# Patient Record
Sex: FEMALE | Race: WHITE | NOT HISPANIC OR LATINO | Employment: OTHER | URBAN - METROPOLITAN AREA
[De-identification: names, ages, dates, MRNs, and addresses within clinical notes are randomized per-mention and may not be internally consistent; named-entity substitution may affect disease eponyms.]

---

## 2016-09-07 LAB — MAMMOGRAM: NORMAL

## 2016-10-20 LAB
HBA1C MFR BLD: 6.6 % (ref 0–5.7)
TSH SERPL-ACNC: 1.65 UIU/ML (ref 0.35–4.74)

## 2017-01-26 ENCOUNTER — TRANSFERRED RECORDS (OUTPATIENT)
Dept: HEALTH INFORMATION MANAGEMENT | Facility: CLINIC | Age: 72
End: 2017-01-26

## 2017-01-26 LAB
HBA1C MFR BLD: 6.7 % (ref 0–5.7)
POTASSIUM SERPL-SCNC: 4.1 MMOL/L (ref 3.5–5)
TSH SERPL-ACNC: 1.34 UIU/ML (ref 0.35–4.94)

## 2017-03-01 ENCOUNTER — RECORDS - HEALTHEAST (OUTPATIENT)
Dept: BONE DENSITY | Facility: CLINIC | Age: 72
End: 2017-03-01

## 2017-03-01 ENCOUNTER — OFFICE VISIT - HEALTHEAST (OUTPATIENT)
Dept: INTERNAL MEDICINE | Facility: CLINIC | Age: 72
End: 2017-03-01

## 2017-03-01 DIAGNOSIS — M81.0 OP (OSTEOPOROSIS): ICD-10-CM

## 2017-03-01 DIAGNOSIS — M48.50XA VERTEBRAL COMPRESSION FRACTURE (H): ICD-10-CM

## 2017-03-01 DIAGNOSIS — M81.0 AGE-RELATED OSTEOPOROSIS WITHOUT CURRENT PATHOLOGICAL FRACTURE: ICD-10-CM

## 2017-03-02 ENCOUNTER — COMMUNICATION - HEALTHEAST (OUTPATIENT)
Dept: ENDOCRINOLOGY | Facility: CLINIC | Age: 72
End: 2017-03-02

## 2017-04-13 ENCOUNTER — OFFICE VISIT (OUTPATIENT)
Dept: FAMILY MEDICINE | Facility: CLINIC | Age: 72
End: 2017-04-13
Payer: COMMERCIAL

## 2017-04-13 VITALS
HEIGHT: 61 IN | SYSTOLIC BLOOD PRESSURE: 128 MMHG | DIASTOLIC BLOOD PRESSURE: 73 MMHG | TEMPERATURE: 97.7 F | WEIGHT: 135 LBS | HEART RATE: 78 BPM | BODY MASS INDEX: 25.49 KG/M2

## 2017-04-13 DIAGNOSIS — R09.81 NASAL CONGESTION: ICD-10-CM

## 2017-04-13 DIAGNOSIS — H69.93 ETD (EUSTACHIAN TUBE DYSFUNCTION), BILATERAL: ICD-10-CM

## 2017-04-13 DIAGNOSIS — E11.8 TYPE 2 DIABETES MELLITUS WITH COMPLICATION, WITHOUT LONG-TERM CURRENT USE OF INSULIN (H): ICD-10-CM

## 2017-04-13 DIAGNOSIS — K59.00 CONSTIPATION, UNSPECIFIED CONSTIPATION TYPE: Primary | ICD-10-CM

## 2017-04-13 DIAGNOSIS — S32.010S COMPRESSION FRACTURE OF L1 LUMBAR VERTEBRA, SEQUELA: ICD-10-CM

## 2017-04-13 PROCEDURE — 99214 OFFICE O/P EST MOD 30 MIN: CPT | Performed by: NURSE PRACTITIONER

## 2017-04-13 RX ORDER — ASPIRIN 81 MG
100 TABLET, DELAYED RELEASE (ENTERIC COATED) ORAL DAILY
Qty: 90 TABLET | Refills: 1 | Status: SHIPPED | OUTPATIENT
Start: 2017-04-13 | End: 2021-11-30

## 2017-04-13 RX ORDER — POLYETHYLENE GLYCOL 3350 17 G/17G
1 POWDER, FOR SOLUTION ORAL DAILY
Qty: 510 G | Refills: 3 | Status: SHIPPED | OUTPATIENT
Start: 2017-04-13 | End: 2021-11-30

## 2017-04-13 NOTE — NURSING NOTE
"Initial /73  Pulse 78  Temp 97.7  F (36.5  C) (Tympanic)  Ht 5' 1\" (1.549 m)  Wt 135 lb (61.2 kg)  Breastfeeding? No  BMI 25.51 kg/m2 Estimated body mass index is 25.51 kg/(m^2) as calculated from the following:    Height as of this encounter: 5' 1\" (1.549 m).    Weight as of this encounter: 135 lb (61.2 kg). .    Kelsey Pierre CMA ([facility])  "

## 2017-04-13 NOTE — MR AVS SNAPSHOT
After Visit Summary   4/13/2017    Marcia Lawrence    MRN: [id number]           Patient Information     Date Of Birth          1945        Visit Information        Provider Department      4/13/2017 10:40 AM Anna Champion, NP [facility]        Today's Diagnoses     Constipation, unspecified constipation type    -  1    Nasal congestion        Type 2 diabetes mellitus with complication, without long-term current use of insulin (H)        Compression fracture of L1 lumbar vertebra, sequela        ETD (eustachian tube dysfunction), bilateral          Care Instructions               Bowel regimen:  Docusate (Colace) - 1 gelcap once daily in am EVERYDAY.  Miralax 1/2 capful EVERYDAY - except for the next 2-3 days take 1 capful until you have a bowel movement then go back to 1/2 capful after that.    Increase fluids (8 - 8 ounce glasses of non caffeinated beverages)  Increase fiber in your diet.    Follow up with me if your symptoms do not improve.    ELSY Mcknight                       Fiber in Your Diet  Why do I need fiber in my diet?   Dietary fiber is the part of plants that cannot be digested. There are 2 kinds of dietary fiber. Insoluble fiber adds bulk to keep foods moving through the digestive system. Soluble fiber holds water which, in turn, softens the stool for easy bowel movements. Fiber is an important part of your diet even though it passes through your body. A high-fiber diet can:  Reduce cholesterol levels.   Help you have regular bowel movements.   Improve your blood sugar level if you have diabetes.   Treat diverticular disease (inflammation of part of the intestine) and irritable bowel syndrome (abdominal pain, diarrhea, and constipation that come and go).   Help you lose weight. High-fiber foods are usually lower in calories and provide a feeling of fullness.  If you do not have enough fiber in your diet, you may have constipation. Your bowel movements  may be small, hard, and dry.  What foods contain fiber?   Breads, cereals, and pasta made with whole-grain flour, brown and wild rice, oats, bulgur, popcorn, and quinoa are high-fiber foods. Breakfast cereals and most grain products list the bran or fiber content so you can know which products are high in fiber.  All fruits and vegetables also contain fiber. Dried beans, peas, nuts, leafy vegetables, raisins, prunes, apples, berries and citrus fruits are all especially good sources of fiber.  How much fiber do I need in my diet?   You should have at least 14 grams of fiber for every 1000 calories that you eat every day. Generally, women should have 25 grams (g) of fiber and men should have 38 grams. Read the label on food packages to find out how much fiber a serving of a food will provide. Foods containing more than 20% of the daily value of fiber per serving are considered high in fiber.  What can I do to increase fiber?   When increasing the fiber in your diet, it is best to do so slowly, because large, sudden increases can cause discomfort, gas, and bloating. Start with small changes, like switching to whole-grain bread, and add a new source of fiber each week or two. You may have some gas or bloating at first, but your body will usually adjust in time. If you keep having uncomfortable gas, you can try a natural enzyme supplement that helps to digest the gas-forming part of plant foods. The enzyme is sold in liquid and pill form and you don t need a prescription for it.  Start your day with a high-fiber breakfast cereal.   Buy more fruits and vegetables. If you buy them, you are more likely to eat them. Use carrot sticks or apple slices for snacks. Include fruits or vegetables with every meal. Cooked fiber is just as effective as raw fiber.   Eat whole-grain breads.   Add whole grains, dried beans, and vegetables to casseroles.   Serve fruit-based desserts.   If you have constipation even though you have added  high-fiber foods to your diet, make sure you are drinking enough fluids and talk to your healthcare provider about fiber laxatives. Psyllium is a soluble fiber that is often used for this purpose. It can be taken as a pill or as a powder that is mixed in a glass of water. Always read and follow the directions on the label carefully.  Adding fiber to your diet is easy, and a high-fiber diet can provide long-term health benefits.    Published by [employer].  This content is reviewed periodically and is subject to change as new health information becomes available. The information is intended to inform and educate and is not a replacement for medical evaluation, advice, diagnosis or treatment by a healthcare professional.  Developed by Gloria Cross MD, for [employer].    2011 [facility] and/or its affiliates. All rights reserved.            Constipation  What is constipation?   Constipation means that bowel movements are infrequent and hard to pass and cause you to strain during bowel movements. It is not considered to be constipation if the bowel movement is not hard and difficult to pass.  What is the normal frequency of bowel movements for one person can be different for another person. For some people, 3 times a day is normal. For others once every 3 days may be normal. What's important is whether there is a change in what has been normal for you.   How does it occur?   You may have constipation because:  You ignore the urge and wait too long to have bowel movements.   You overuse some types of laxatives.   You do not drink enough fluids.   You do not eat enough fiber.   You don't have enough physical activity.   You are taking iron pills or a medicine that has a side effect of constipation.  Other possible causes are:  pregnancy   depression or stress   some medical conditions and diseases.  What are the symptoms?   Symptoms may include having:  small bowel movements   hard, dry bowel movements   uncomfortable  or painful bowel movements that are hard to pass   a longer time than usual between bowel movements   bloating and feeling like you have a full bowel.  Normal bowel movements vary from person to person. For some people, 3 times a day is normal. For others 3 times a week may be normal. What's important are changes in what has been normal for you.  How is it treated?   To ease your constipation:  Drink more fluids.   Add more fiber to your diet, such as bran muffins, niru crackers, oatmeal, brown rice, whole wheat bread, fresh fruits and vegetables, and popcorn.   Get more exercise.   Make sure that you go to the bathroom whenever you feel that you need to go. Don t wait.  Laxatives may be used for a short time, generally less than 1 week. Many people find fiber supplements, such as Metamucil, Citrucel, or other psyllium products, to be helpful, but sometimes they can make constipation worse.  Ask your healthcare provider if any medicines you are taking may be causing constipation.  Tell your healthcare provider if:  You start having constipation after years of normal bowel movements.   You have bouts of constipation alternating with bouts of diarrhea.   You have pain during bowel movements or for some time afterward.   Your bowel movements are dark or tar-colored or have blood in them.   You are losing weight without trying.  How can I take care of myself?   To help take care of yourself:  Eat fresh vegetables and fruit every day.   Exercise regularly. For example, if you are able, walk for at least 30 minutes every day. Check with your healthcare provider before adding any new exercise.   Drink prune juice or eat stewed fruits at breakfast.   Drink enough liquids each day to keep your urine light yellow in color.   Increase the whole-grain fiber in your diet by eating cereals with 5 or more grams of fiber per serving (for example, shredded wheat or bran flakes).   Ask your healthcare provider about taking fiber  products or laxatives or giving yourself an enema. You can take a fiber product like Metamucil or Citrucel once or twice a day for several days if you are constipated. Avoid overusing other laxatives, such as cathartics, which are products that will cause a liquid bowel movement. Cathartics, including Milk of magnesia or Epsom salt, irritate the lining of the intestines.   Call your provider if:   Constipation lasts longer than 1 week.   You have constipation alternating with diarrhea.   You have blood in your stool.   You have severe abdominal pain.   You have abdominal swelling or vomiting.   You have a fever higher than 101.5  F (38.6  C).   You have any symptoms that worry you.     Published by [employer].  This content is reviewed periodically and is subject to change as new health information becomes available. The information is intended to inform and educate and is not a replacement for medical evaluation, advice, diagnosis or treatment by a healthcare professional.  Developed by Carmel Jones RN, [place], and [employer].    2011 [facility] and/or its affiliates. All rights reserved.                            Follow-ups after your visit        Additional Services     OTOLARYNGOLOGY REFERRAL       Your provider has referred you to: [facility]: [facility] [phone]   [email or web address]    Please be aware that coverage of these services is subject to the terms and limitations of your health insurance plan.  Call member services at your health plan with any benefit or coverage questions.      Please bring the following with you to your appointment:    (1) Any X-Rays, CTs or MRIs which have been performed.  Contact the facility where they were done to arrange for  prior to your scheduled appointment.   (2) List of current medications  (3) This referral request   (4) Any documents/labs given to you for this referral                  Who to contact     If you  "have questions or need follow up information about today's clinic visit or your schedule please contact [facility] directly at [phone].  Normal or non-critical lab and imaging results will be communicated to you by MyChart, letter or phone within 4 business days after the clinic has received the results. If you do not hear from us within 7 days, please contact the clinic through [employer] or phone. If you have a critical or abnormal lab result, we will notify you by phone as soon as possible.  Submit refill requests through [employer] or call your pharmacy and they will forward the refill request to us. Please allow 3 business days for your refill to be completed.          Additional Information About Your Visit        [employer] Information     [employer] lets you send messages to your doctor, view your test results, renew your prescriptions, schedule appointments and more. To sign up, go to [email or web address] . Click on \"Log in\" on the left side of the screen, which will take you to the Welcome page. Then click on \"Sign up Now\" on the right side of the page.     You will be asked to enter the access code listed below, as well as some personal information. Please follow the directions to create your username and password.     Your access code is: [email or web address]  Expires: 2017 11:25 AM     Your access code will  in 90 days. If you need help or a new code, please call your [place] clinic or [phone].        Care EveryWhere ID     This is your Care EveryWhere ID. This could be used by other organizations to access your [place] medical records  [id number]        Your Vitals Were     Pulse Temperature Height Breastfeeding? BMI (Body Mass Index)       78 97.7  F (36.5  C) (Tympanic) 5' 1\" (1.549 m) No 25.51 kg/m2        Blood Pressure from Last 3 Encounters:   17 128/73   16 91/55   16 150/80    Weight from Last 3 Encounters:   17 135 lb (61.2 kg)   16 134 lb " (60.8 kg)   09/19/16 133 lb 12.8 oz (60.7 kg)              We Performed the Following     OTOLARYNGOLOGY REFERRAL          Today's Medication Changes          These changes are accurate as of: 4/13/17 11:31 AM.  If you have any questions, ask your nurse or doctor.               Start taking these medicines.        Dose/Directions    docusate sodium 100 MG tablet   Commonly known as:  COLACE   Used for:  Constipation, unspecified constipation type   Started by:  Anna Champion NP        Dose:  100 mg   Take 100 mg by mouth daily   Quantity:  90 tablet   Refills:  1         These medicines have changed or have updated prescriptions.        Dose/Directions    * MIRALAX powder   This may have changed:  Another medication with the same name was added. Make sure you understand how and when to take each.   Used for:  Constipation   Generic drug:  polyethylene glycol   Changed by:  Wendi Arnold MD        Dose:  1 capful   Take 1 capful by mouth daily as needed   Quantity:  510 g   Refills:  1       * polyethylene glycol powder   Commonly known as:  MIRALAX   This may have changed:  You were already taking a medication with the same name, and this prescription was added. Make sure you understand how and when to take each.   Used for:  Constipation, unspecified constipation type   Changed by:  Anna Champion NP        Dose:  1 capful   Take 17 g (1 capful) by mouth daily   Quantity:  510 g   Refills:  3       * Notice:  This list has 2 medication(s) that are the same as other medications prescribed for you. Read the directions carefully, and ask your doctor or other care provider to review them with you.         Where to get your medicines      These medications were sent to [facility] [address] [place], [place] [address] [facility] [address]     Phone:  [phone]     docusate sodium 100 MG tablet    polyethylene glycol powder                Primary Care  Provider Office Phone # Fax #    Anna ChampionJERED [phone]       [facility] [address] [facility] [address]        Thank you!     Thank you for choosing [facility]  for your care. Our goal is always to provide you with excellent care. Hearing back from our patients is one way we can continue to improve our services. Please take a few minutes to complete the written survey that you may receive in the mail after your visit with us. Thank you!             Your Updated Medication List - Protect others around you: Learn how to safely use, store and throw away your medicines at www.disposemymeds.org.          This list is accurate as of: 4/13/17 11:31 AM.  Always use your most recent med list.                   Brand Name Dispense Instructions for use    acetaminophen 325 MG tablet    TYLENOL    100 tablet    Take 2 tablets (650 mg) by mouth every 4 hours as needed for mild pain or fever       atorvastatin 40 MG tablet    LIPITOR    30 tablet    Take 20 mg by mouth daily       docusate sodium 100 MG tablet    COLACE    90 tablet    Take 100 mg by mouth daily       insulin  UNIT/ML injection    HumuLIN N/NovoLIN N     Inject 5 Units Subcutaneous daily       ipratropium 0.06 % spray    ATROVENT    15 mL    Spray 2 sprays into both nostrils 3 times daily       LEVOTHYROXINE SODIUM PO      Take 112 mcg by mouth daily       lisinopril 40 MG tablet    PRINIVIL/ZESTRIL    90 tablet    Take 1 tablet (40 mg) by mouth daily       * MIRALAX powder   Generic drug:  polyethylene glycol     510 g    Take 1 capful by mouth daily as needed       * polyethylene glycol powder    MIRALAX    510 g    Take 17 g (1 capful) by mouth daily       multivitamin  peds with iron 60 MG chewable tablet      Take 1 chew tab by mouth 2 times daily       NovoLIN R VIAL 100 UNIT/ML injection   Generic drug:  insulin regular      5 units before breakfast, lunch and 5 units at supper Plus 2  units for every 50 mg above 120 : approximately 54 units daily       omeprazole 20 MG CR capsule    priLOSEC    60 capsule    Take 20 mg by mouth daily       order for DME     1 Device    Equipment being ordered: home blood pressure machine/monitor.       VITAMIN B-12 PO      Take 1,000 mcg by mouth daily       VITAMIN D3 PO      Take 3,000 Units by mouth daily       WELLBUTRIN  MG 12 hr tablet   Generic drug:  buPROPion      Take 150 mg by mouth 2 times daily       * Notice:  This list has 2 medication(s) that are the same as other medications prescribed for you. Read the directions carefully, and ask your doctor or other care provider to review them with you.

## 2017-04-13 NOTE — PROGRESS NOTES
SUBJECTIVE:                                                    Marcia Lawrence is a 72 year old female who presents to clinic today for the following health issues:    Constipation     Onset: ongoing 2 weeks     Description:   Frequency of bowel movements: 4-5 days.  Stool consistency: hard, small caliber    Progression of Symptoms:  worsening    Accompanying Signs & Symptoms:  Abdominal pain (cramping?): no   Blood in stool: no   Rectal pain: YES  Nausea/vomiting: YES, some nausea   Weight loss or gain: no    History:   History of abdominal surgery: YES- exploratory lap, c section, hernia repair.     Precipitating factors:   Recent use of narcotics, anticholinergics, calcium channel blockers, antacids, or iron supplements: no   Chronic Laxative Use: no          Therapies Tried and outcome: stool softeners, suppositories, miralax, dulcolax     Taking Laxative a few days ago, MOM X 1, Miralax - taken 1 capfule 2 X a few days a part.  Does not take anything daily for prevention of medications.    No blood in stool.  No abdominal pain.    Takes Calcium Citrate 3 X daily in addition to Multi vit with Iron.  Also taking Vitamin D.  History of compression fractures with osteoporosis.      Diabetes Follow-up      Patient is checking blood sugars: not at all    Diabetic concerns: None     Symptoms of hypoglycemia (low blood sugar): none     Paresthesias (numbness or burning in feet) or sores: No     Date of last diabetic eye exam: March 2015    See's Endocrinology Dr. King in [place]     Hyperlipidemia Follow-Up      Rate your low fat/cholesterol diet?: good    Taking statin?  No    Other lipid medications/supplements?:  none     Hypertension Follow-up      Outpatient blood pressures are not being checked.    Low Salt Diet: no added salt         Problem list and histories reviewed & adjusted, as indicated.  Additional history: as documented    Patient Active Problem List   Diagnosis     PROXIMAL LEFT HUMERUS FRACTURE      Esophageal reflux     Compression fracture of L1 lumbar vertebra (H)     Advanced directives, counseling/discussion     Adjustment disorder with mixed anxiety and depressed mood     Health Care Home     Bariatric surgery status (GASTRIC BYPASS)     Hyperlipidemia LDL goal <100     Intestinal malabsorption     Lumbar compression fracture (H)     Age-related osteoporosis with current pathological fracture     Type 2 diabetes mellitus with complication (H)     Adjustment disorder with depressed mood     Essential hypertension with goal blood pressure less than 140/90     Acute midline low back pain with sciatica, sciatica laterality unspecified     Past Surgical History:   Procedure Laterality Date     GASTRIC BYPASS       hernia repair         Social History   Substance Use Topics     Smoking status: Never Smoker     Smokeless tobacco: Never Used     Alcohol use No     Family History   Problem Relation Age of Onset     DIABETES Maternal Grandmother      DIABETES Maternal Grandfather          Current Outpatient Prescriptions   Medication Sig Dispense Refill     polyethylene glycol (MIRALAX) powder Take 17 g (1 capful) by mouth daily 510 g 3     docusate sodium (COLACE) 100 MG tablet Take 100 mg by mouth daily 90 tablet 1     order for DME Equipment being ordered: home blood pressure machine/monitor. 1 Device 0     insulin regular (NOVOLIN R VIAL) 100 UNIT/ML VIAL 5 units before breakfast, lunch and 5 units at supper Plus 2 units for every 50 mg above 120 : approximately 54 units daily       lisinopril (PRINIVIL,ZESTRIL) 40 MG tablet Take 1 tablet (40 mg) by mouth daily 90 tablet 3     ipratropium (ATROVENT) 0.06 % nasal spray Spray 2 sprays into both nostrils 3 times daily 15 mL 11     insulin NPH (HUMULIN N VIAL) 100 UNIT/ML susp Inject 5 Units Subcutaneous daily        multivitamin  peds with iron (FLINTSTONES COMPLETE) 60 MG chewable tablet Take 1 chew tab by mouth 2 times daily       polyethylene glycol (MIRALAX)  powder Take 1 capful by mouth daily as needed  510 g 1     omeprazole (PRILOSEC) 20 MG capsule Take 20 mg by mouth daily  60 capsule      atorvastatin (LIPITOR) 40 MG tablet Take 20 mg by mouth daily  30 tablet      acetaminophen (TYLENOL) 325 MG tablet Take 2 tablets (650 mg) by mouth every 4 hours as needed for mild pain or fever 100 tablet      Cholecalciferol (VITAMIN D3 PO) Take 3,000 Units by mouth daily        Cyanocobalamin (VITAMIN B-12 PO) Take 1,000 mcg by mouth daily       buPROPion (WELLBUTRIN SR) 150 MG 12 hr tablet Take 150 mg by mouth 2 times daily       LEVOTHYROXINE SODIUM PO Take 112 mcg by mouth daily       Allergies   Allergen Reactions     Percocet [Oxycodone-Acetaminophen] Other (See Comments)     Delirium      Aspirin Other (See Comments)     Gastric bypass     Cefuroxime Swelling     Penicillins Hives     Sulfa Drugs Rash     Recent Labs   Lab Test  08/31/16   0906  10/24/14   0741  08/18/14   1052  08/11/14   1148  07/24/14   0700  07/23/14   0650  07/16/14 03/07/14 03/06/12   A1C   --    --    --    --    --   6.8*   --   7*   --   6.4*   --    LDL   --    --    --    --    --    --    --    --    --   37  67   HDL   --    --    --    --    --    --    --    --    --    --   56   TRIG   --    --    --    --    --    --    --    --    --    --   103   ALT   --    --    --   71*  171*  301*   < >  46   --    --    --    CR  0.60  0.58   --   0.69  0.51*  0.54   < >   --    < >   --    --    GFRESTIMATED  >90  Non  GFR Calc    >90  Non  GFR Calc     --   84  >90  >90   < >   --    < >   --    --    GFRESTBLACK  >90   GFR Calc    >90   GFR Calc     --   >90   GFR Calc    >90  >90   < >   --    < >   --    --    POTASSIUM  4.4  3.6   --   4.0  4.0  3.7   < >  3.9   --    --    --    TSH   --    --   2.69   --    --    --    --    --    --   1.44   --     < > = values in this interval not displayed.      BP Readings  "from Last 3 Encounters:   04/13/17 128/73   09/23/16 91/55   09/19/16 150/80    Wt Readings from Last 3 Encounters:   04/13/17 135 lb (61.2 kg)   09/23/16 134 lb (60.8 kg)   09/19/16 133 lb 12.8 oz (60.7 kg)                  Labs reviewed in EPIC    Reviewed and updated as needed this visit by clinical staff       Reviewed and updated as needed this visit by Provider         ROS:  Constitutional, HEENT, cardiovascular, pulmonary, GI, , musculoskeletal, neuro, skin, endocrine and psych systems are negative, except as otherwise noted.    OBJECTIVE:                                                    /73  Pulse 78  Temp 97.7  F (36.5  C) (Tympanic)  Ht 5' 1\" (1.549 m)  Wt 135 lb (61.2 kg)  Breastfeeding? No  BMI 25.51 kg/m2  Body mass index is 25.51 kg/(m^2).  GENERAL: alert, no distress, frail and elderly  HEENT: clear effusion left, TM normal right, no cerumen bilateral.  NECK: no adenopathy, no asymmetry, masses, or scars and thyroid normal to palpation  RESP: lungs clear to auscultation - no rales, rhonchi or wheezes  CV: regular rate and rhythm, normal S1 S2, no S3 or S4, no murmur, click or rub, no peripheral edema and peripheral pulses strong  ABDOMEN: soft, nontender, no hepatosplenomegaly, no masses and bowel sounds normal  MS: no gross musculoskeletal defects noted, no edema    Diagnostic Test Results:  none      ASSESSMENT/PLAN:                                                      1. Constipation, unspecified constipation type   Discussed prevention and treatment options.  Patient is on higher doses of Calcium and Iron due to history of compression fractures/osteoporosis.  Will need daily bowel program/medication.  The risks, benefits and treatment options of prescribed medications or other treatments have been discussed with the patient. The patient verbalized their understanding and should call or follow up if no improvement or if they develop further problems.        - polyethylene glycol " (MIRALAX) powder; Take 17 g (1 capful) by mouth daily  Dispense: 510 g; Refill: 3  - docusate sodium (COLACE) 100 MG tablet; Take 100 mg by mouth daily  Dispense: 90 tablet; Refill: 1    2. Nasal congestion       3. Type 2 diabetes mellitus with complication, without long-term current use of insulin (H)   Controlled per patient.  Accessed care everywhere - will sent labs to [facility] to abstract into chart to update DIABETES [facility].    4. Compression fracture of L1 lumbar vertebra, sequela       5. ETD (eustachian tube dysfunction), bilateral     - OTOLARYNGOLOGY REFERRAL    Total times spent with patient 30 minutes of which > 50% of the time was spent counseling and coordination of care discussion of constipation, medications, recommendations, follow up and handouts discussed/given.      Patient Instructions              Bowel regimen:  Docusate (Colace) - 1 gelcap once daily in am EVERYDAY.  Miralax 1/2 capful EVERYDAY - except for the next 2-3 days take 1 capful until you have a bowel movement then go back to 1/2 capful after that.    Increase fluids (8 - 8 ounce glasses of non caffeinated beverages)  Increase fiber in your diet.    Follow up with me if your symptoms do not improve.    ELSY Mcknight                       Fiber in Your Diet  Why do I need fiber in my diet?   Dietary fiber is the part of plants that cannot be digested. There are 2 kinds of dietary fiber. Insoluble fiber adds bulk to keep foods moving through the digestive system. Soluble fiber holds water which, in turn, softens the stool for easy bowel movements. Fiber is an important part of your diet even though it passes through your body. A high-fiber diet can:  Reduce cholesterol levels.   Help you have regular bowel movements.   Improve your blood sugar level if you have diabetes.   Treat diverticular disease (inflammation of part of the intestine) and irritable bowel syndrome (abdominal pain, diarrhea, and constipation that come and go).    Help you lose weight. High-fiber foods are usually lower in calories and provide a feeling of fullness.  If you do not have enough fiber in your diet, you may have constipation. Your bowel movements may be small, hard, and dry.  What foods contain fiber?   Breads, cereals, and pasta made with whole-grain flour, brown and wild rice, oats, bulgur, popcorn, and quinoa are high-fiber foods. Breakfast cereals and most grain products list the bran or fiber content so you can know which products are high in fiber.  All fruits and vegetables also contain fiber. Dried beans, peas, nuts, leafy vegetables, raisins, prunes, apples, berries and citrus fruits are all especially good sources of fiber.  How much fiber do I need in my diet?   You should have at least 14 grams of fiber for every 1000 calories that you eat every day. Generally, women should have 25 grams (g) of fiber and men should have 38 grams. Read the label on food packages to find out how much fiber a serving of a food will provide. Foods containing more than 20% of the daily value of fiber per serving are considered high in fiber.  What can I do to increase fiber?   When increasing the fiber in your diet, it is best to do so slowly, because large, sudden increases can cause discomfort, gas, and bloating. Start with small changes, like switching to whole-grain bread, and add a new source of fiber each week or two. You may have some gas or bloating at first, but your body will usually adjust in time. If you keep having uncomfortable gas, you can try a natural enzyme supplement that helps to digest the gas-forming part of plant foods. The enzyme is sold in liquid and pill form and you don t need a prescription for it.  Start your day with a high-fiber breakfast cereal.   Buy more fruits and vegetables. If you buy them, you are more likely to eat them. Use carrot sticks or apple slices for snacks. Include fruits or vegetables with every meal. Cooked fiber is just  as effective as raw fiber.   Eat whole-grain breads.   Add whole grains, dried beans, and vegetables to casseroles.   Serve fruit-based desserts.   If you have constipation even though you have added high-fiber foods to your diet, make sure you are drinking enough fluids and talk to your healthcare provider about fiber laxatives. Psyllium is a soluble fiber that is often used for this purpose. It can be taken as a pill or as a powder that is mixed in a glass of water. Always read and follow the directions on the label carefully.  Adding fiber to your diet is easy, and a high-fiber diet can provide long-term health benefits.    Published by [employer].  This content is reviewed periodically and is subject to change as new health information becomes available. The information is intended to inform and educate and is not a replacement for medical evaluation, advice, diagnosis or treatment by a healthcare professional.  Developed by Gloria Cross MD, for [employer].    2011 [facility] and/or its affiliates. All rights reserved.            Constipation  What is constipation?   Constipation means that bowel movements are infrequent and hard to pass and cause you to strain during bowel movements. It is not considered to be constipation if the bowel movement is not hard and difficult to pass.  What is the normal frequency of bowel movements for one person can be different for another person. For some people, 3 times a day is normal. For others once every 3 days may be normal. What's important is whether there is a change in what has been normal for you.   How does it occur?   You may have constipation because:  You ignore the urge and wait too long to have bowel movements.   You overuse some types of laxatives.   You do not drink enough fluids.   You do not eat enough fiber.   You don't have enough physical activity.   You are taking iron pills or a medicine that has a side effect of constipation.  Other possible causes  are:  pregnancy   depression or stress   some medical conditions and diseases.  What are the symptoms?   Symptoms may include having:  small bowel movements   hard, dry bowel movements   uncomfortable or painful bowel movements that are hard to pass   a longer time than usual between bowel movements   bloating and feeling like you have a full bowel.  Normal bowel movements vary from person to person. For some people, 3 times a day is normal. For others 3 times a week may be normal. What's important are changes in what has been normal for you.  How is it treated?   To ease your constipation:  Drink more fluids.   Add more fiber to your diet, such as bran muffins, niru crackers, oatmeal, brown rice, whole wheat bread, fresh fruits and vegetables, and popcorn.   Get more exercise.   Make sure that you go to the bathroom whenever you feel that you need to go. Don t wait.  Laxatives may be used for a short time, generally less than 1 week. Many people find fiber supplements, such as Metamucil, Citrucel, or other psyllium products, to be helpful, but sometimes they can make constipation worse.  Ask your healthcare provider if any medicines you are taking may be causing constipation.  Tell your healthcare provider if:  You start having constipation after years of normal bowel movements.   You have bouts of constipation alternating with bouts of diarrhea.   You have pain during bowel movements or for some time afterward.   Your bowel movements are dark or tar-colored or have blood in them.   You are losing weight without trying.  How can I take care of myself?   To help take care of yourself:  Eat fresh vegetables and fruit every day.   Exercise regularly. For example, if you are able, walk for at least 30 minutes every day. Check with your healthcare provider before adding any new exercise.   Drink prune juice or eat stewed fruits at breakfast.   Drink enough liquids each day to keep your urine light yellow in color.    Increase the whole-grain fiber in your diet by eating cereals with 5 or more grams of fiber per serving (for example, shredded wheat or bran flakes).   Ask your healthcare provider about taking fiber products or laxatives or giving yourself an enema. You can take a fiber product like Metamucil or Citrucel once or twice a day for several days if you are constipated. Avoid overusing other laxatives, such as cathartics, which are products that will cause a liquid bowel movement. Cathartics, including Milk of magnesia or Epsom salt, irritate the lining of the intestines.   Call your provider if:   Constipation lasts longer than 1 week.   You have constipation alternating with diarrhea.   You have blood in your stool.   You have severe abdominal pain.   You have abdominal swelling or vomiting.   You have a fever higher than 101.5  F (38.6  C).   You have any symptoms that worry you.     Published by [employer].  This content is reviewed periodically and is subject to change as new health information becomes available. The information is intended to inform and educate and is not a replacement for medical evaluation, advice, diagnosis or treatment by a healthcare professional.  Developed by Carmel Jones RN, [place], and [employer].    2011 [employer] [employer] and/or its affiliates. All rights reserved.                          Anna Champion NP  [facility]

## 2017-04-13 NOTE — PATIENT INSTRUCTIONS
Bowel regimen:  Docusate (Colace) - 1 gelcap once daily in am EVERYDAY.  Miralax 1/2 capful EVERYDAY - except for the next 2-3 days take 1 capful until you have a bowel movement then go back to 1/2 capful after that.    Increase fluids (8 - 8 ounce glasses of non caffeinated beverages)  Increase fiber in your diet.    Follow up with me if your symptoms do not improve.    ELSY Mcknight                       Fiber in Your Diet  Why do I need fiber in my diet?   Dietary fiber is the part of plants that cannot be digested. There are 2 kinds of dietary fiber. Insoluble fiber adds bulk to keep foods moving through the digestive system. Soluble fiber holds water which, in turn, softens the stool for easy bowel movements. Fiber is an important part of your diet even though it passes through your body. A high-fiber diet can:  Reduce cholesterol levels.   Help you have regular bowel movements.   Improve your blood sugar level if you have diabetes.   Treat diverticular disease (inflammation of part of the intestine) and irritable bowel syndrome (abdominal pain, diarrhea, and constipation that come and go).   Help you lose weight. High-fiber foods are usually lower in calories and provide a feeling of fullness.  If you do not have enough fiber in your diet, you may have constipation. Your bowel movements may be small, hard, and dry.  What foods contain fiber?   Breads, cereals, and pasta made with whole-grain flour, brown and wild rice, oats, bulgur, popcorn, and quinoa are high-fiber foods. Breakfast cereals and most grain products list the bran or fiber content so you can know which products are high in fiber.  All fruits and vegetables also contain fiber. Dried beans, peas, nuts, leafy vegetables, raisins, prunes, apples, berries and citrus fruits are all especially good sources of fiber.  How much fiber do I need in my diet?   You should have at least 14 grams of fiber for every 1000 calories that you eat  every day. Generally, women should have 25 grams (g) of fiber and men should have 38 grams. Read the label on food packages to find out how much fiber a serving of a food will provide. Foods containing more than 20% of the daily value of fiber per serving are considered high in fiber.  What can I do to increase fiber?   When increasing the fiber in your diet, it is best to do so slowly, because large, sudden increases can cause discomfort, gas, and bloating. Start with small changes, like switching to whole-grain bread, and add a new source of fiber each week or two. You may have some gas or bloating at first, but your body will usually adjust in time. If you keep having uncomfortable gas, you can try a natural enzyme supplement that helps to digest the gas-forming part of plant foods. The enzyme is sold in liquid and pill form and you don t need a prescription for it.  Start your day with a high-fiber breakfast cereal.   Buy more fruits and vegetables. If you buy them, you are more likely to eat them. Use carrot sticks or apple slices for snacks. Include fruits or vegetables with every meal. Cooked fiber is just as effective as raw fiber.   Eat whole-grain breads.   Add whole grains, dried beans, and vegetables to casseroles.   Serve fruit-based desserts.   If you have constipation even though you have added high-fiber foods to your diet, make sure you are drinking enough fluids and talk to your healthcare provider about fiber laxatives. Psyllium is a soluble fiber that is often used for this purpose. It can be taken as a pill or as a powder that is mixed in a glass of water. Always read and follow the directions on the label carefully.  Adding fiber to your diet is easy, and a high-fiber diet can provide long-term health benefits.    Published by [employer].  This content is reviewed periodically and is subject to change as new health information becomes available. The information is intended to inform and  educate and is not a replacement for medical evaluation, advice, diagnosis or treatment by a healthcare professional.  Developed by Gloria Cross MD, for [employer].    2011 [employer] and/or its affiliates. All rights reserved.            Constipation  What is constipation?   Constipation means that bowel movements are infrequent and hard to pass and cause you to strain during bowel movements. It is not considered to be constipation if the bowel movement is not hard and difficult to pass.  What is the normal frequency of bowel movements for one person can be different for another person. For some people, 3 times a day is normal. For others once every 3 days may be normal. What's important is whether there is a change in what has been normal for you.   How does it occur?   You may have constipation because:  You ignore the urge and wait too long to have bowel movements.   You overuse some types of laxatives.   You do not drink enough fluids.   You do not eat enough fiber.   You don't have enough physical activity.   You are taking iron pills or a medicine that has a side effect of constipation.  Other possible causes are:  pregnancy   depression or stress   some medical conditions and diseases.  What are the symptoms?   Symptoms may include having:  small bowel movements   hard, dry bowel movements   uncomfortable or painful bowel movements that are hard to pass   a longer time than usual between bowel movements   bloating and feeling like you have a full bowel.  Normal bowel movements vary from person to person. For some people, 3 times a day is normal. For others 3 times a week may be normal. What's important are changes in what has been normal for you.  How is it treated?   To ease your constipation:  Drink more fluids.   Add more fiber to your diet, such as bran muffins, niru crackers, oatmeal, brown rice, whole wheat bread, fresh fruits and vegetables, and popcorn.   Get more exercise.   Make sure that you go  to the bathroom whenever you feel that you need to go. Don t wait.  Laxatives may be used for a short time, generally less than 1 week. Many people find fiber supplements, such as Metamucil, Citrucel, or other psyllium products, to be helpful, but sometimes they can make constipation worse.  Ask your healthcare provider if any medicines you are taking may be causing constipation.  Tell your healthcare provider if:  You start having constipation after years of normal bowel movements.   You have bouts of constipation alternating with bouts of diarrhea.   You have pain during bowel movements or for some time afterward.   Your bowel movements are dark or tar-colored or have blood in them.   You are losing weight without trying.  How can I take care of myself?   To help take care of yourself:  Eat fresh vegetables and fruit every day.   Exercise regularly. For example, if you are able, walk for at least 30 minutes every day. Check with your healthcare provider before adding any new exercise.   Drink prune juice or eat stewed fruits at breakfast.   Drink enough liquids each day to keep your urine light yellow in color.   Increase the whole-grain fiber in your diet by eating cereals with 5 or more grams of fiber per serving (for example, shredded wheat or bran flakes).   Ask your healthcare provider about taking fiber products or laxatives or giving yourself an enema. You can take a fiber product like Metamucil or Citrucel once or twice a day for several days if you are constipated. Avoid overusing other laxatives, such as cathartics, which are products that will cause a liquid bowel movement. Cathartics, including Milk of magnesia or Epsom salt, irritate the lining of the intestines.   Call your provider if:   Constipation lasts longer than 1 week.   You have constipation alternating with diarrhea.   You have blood in your stool.   You have severe abdominal pain.   You have abdominal swelling or vomiting.   You have a  fever higher than 101.5  F (38.6  C).   You have any symptoms that worry you.     Published by [employer].  This content is reviewed periodically and is subject to change as new health information becomes available. The information is intended to inform and educate and is not a replacement for medical evaluation, advice, diagnosis or treatment by a healthcare professional.  Developed by Carmel Jones RN, [place], and [facility].    2011 [facility] and/or its affiliates. All rights reserved.

## 2017-05-04 ENCOUNTER — TELEPHONE (OUTPATIENT)
Dept: FAMILY MEDICINE | Facility: CLINIC | Age: 72
End: 2017-05-04

## 2017-05-04 NOTE — TELEPHONE ENCOUNTER
S-(situation): I spoke with pt.  We have never seen her for any arthritic symptoms before.  Pt explains that she recently saw her endocrinologist (non[facility] provider) for her diabetes and he suggested that pt see a rheumatologist for RA assessment.  Pt says that she has achy back and knees.  Hands are swollen and painful.  Pt thought it was a part of aging.    B-(background): DM 2, osteoporosis    A-(assessment): achy back, knees, and swollen, painful hands.    R-(recommendations): Advised appt with PCP for initial assessment and pre-work.  Referral can be discussed at appt.  Pt agrees.  Jayleen Quiles RN

## 2017-05-04 NOTE — TELEPHONE ENCOUNTER
Reason for Call: Request for an order or referral:    Order or referral being requested: rheumatoid specialist      Date needed: as soon as possible    Has the patient been seen by the PCP for this problem? NO    Additional comments: pt is calling because she got a referral from another provider to see this type of specialist  But she can not get in till July and does not want to wait that long and is wanting to go some place closer then [place]   She is wanting PCP to give a new referral, Due to finger swelling        Phone number Patient can be reached at:  Home number on file [phone] (home)    Best Time:  Any     Can we leave a detailed message on this number?  YES    Call taken on 5/4/2017 at 8:20 AM by Tahmina Thornton

## 2017-09-05 ENCOUNTER — AMBULATORY - HEALTHEAST (OUTPATIENT)
Dept: NURSING | Facility: CLINIC | Age: 72
End: 2017-09-05

## 2017-09-05 DIAGNOSIS — M81.0 OP (OSTEOPOROSIS): ICD-10-CM

## 2017-09-07 DIAGNOSIS — Z12.39 BREAST CANCER SCREENING: ICD-10-CM

## 2018-03-06 ENCOUNTER — OFFICE VISIT - HEALTHEAST (OUTPATIENT)
Dept: INTERNAL MEDICINE | Facility: CLINIC | Age: 73
End: 2018-03-06

## 2018-03-06 DIAGNOSIS — M81.0 OP (OSTEOPOROSIS): ICD-10-CM

## 2018-03-06 LAB — CALCIUM SERPL-MCNC: 10.1 MG/DL (ref 8.5–10.5)

## 2018-03-07 LAB — 25(OH)D3 SERPL-MCNC: 50.9 NG/ML (ref 30–80)

## 2018-03-08 ENCOUNTER — COMMUNICATION - HEALTHEAST (OUTPATIENT)
Dept: FAMILY MEDICINE | Facility: CLINIC | Age: 73
End: 2018-03-08

## 2021-05-30 VITALS — BODY MASS INDEX: 25.22 KG/M2 | WEIGHT: 133.5 LBS

## 2021-05-31 ENCOUNTER — RECORDS - HEALTHEAST (OUTPATIENT)
Dept: ADMINISTRATIVE | Facility: CLINIC | Age: 76
End: 2021-05-31

## 2021-06-01 ENCOUNTER — RECORDS - HEALTHEAST (OUTPATIENT)
Dept: ADMINISTRATIVE | Facility: CLINIC | Age: 76
End: 2021-06-01

## 2021-06-01 VITALS — WEIGHT: 132.2 LBS | BODY MASS INDEX: 24.98 KG/M2

## 2021-06-09 NOTE — PROGRESS NOTES
1. OP (osteoporosis)  DXA Bone Density Scan   2. Vertebral compression fracture         Return in about 6 months (around 9/1/2017) for Recheck.    Patient Instructions   Prolia 1st today.  Prolia 2nd in 6 months with nurse. I will see you in 1 year.  DXA - today .   Phone number to schedule [phone].  Please avoid any extensive dental work as implants and teeth extractions for the next 1-2 months.  Daily calcium need is 5892-4902 mg a day from the diet and supplements.  Calcium citrate is easier to digest.  Vitamin D 2000 IU daily recommended.      Chief Complaint   Patient presents with     Osteoporosis Follow Up       Visit Vitals     /62     Pulse 62     Wt 133 lb 8 oz (60.6 kg)     BMI 25.22 kg/m2           Any medication change in the last 6 months? no  Did you take prednisone or other immunosupressant drugs in the last 6 months   (chemo, transplant, rheum, dermatology conditions)? no  Did you have any serious infection in the last 6 months?no  Any recent hospitalizations?no  Do you plan any dental work in the next 2-3 months?no  How much calcium do you take daily from the diet and supplements?1200 mg  How much vit D do you take daily? 2000 IU  Last DXA? 9/2014 at [facility] showed T-score -3.2 on her femoral neck      Patient is here today for the 1st Prolia injection. Her primary told her to stop taking vit D, but I reviewed Care Everywhere and vit D level was 48. We discussed calcium and vit D daily needs today.   She is known to have multiple vertebral compression fractures and is a diabetic who has had gastric bypass surgery. Prior to starting the Forteo she did receive a dose of Reclast. Forteo was started in 3/2015 -12/2015, then 4/2016 - 10/2016. She cannot afford it anymore.  We will set up DXA scan today and start Prolia. Printed info provided and Prolia discussed with the patient.  Next Prolia injection will be in 6 months.     25 minutes spent with the patient and more then 50 % of the  "time in counseling.  This note has been dictated using voice recognition software. Any grammatical or context distortions are unintentional and inherent to the software      Patient Active Problem List   Diagnosis     OP (osteoporosis)     Avitaminosis D     Vertebral compression fracture       Current Outpatient Prescriptions on File Prior to Visit   Medication Sig Dispense Refill     atorvastatin (LIPITOR) 40 MG tablet Take 20 mg by mouth bedtime.        buPROPion (WELLBUTRIN XL) 150 MG 24 hr tablet Take 150 mg by mouth 2 (two) times a day.       calcium citrate-vitamin D3 (CITRACAL + D) 315-250 mg-unit per tablet Take 3 tablets by mouth daily.       cholecalciferol, vitamin D3, (VITAMIN D3) 2,000 unit cap Take 2 tablets by mouth.       cyanocobalamin (VITAMIN B-12) 1000 MCG tablet Take 1,000 mcg by mouth daily.       INSULIN REGULAR, HUMAN (NOVOLIN R INJ) Inject as directed. 5 units before dinner       ipratropium (ATROVENT) 0.06 % nasal spray        levothyroxine (SYNTHROID, LEVOTHROID) 112 MCG tablet Take 112 mcg by mouth daily.       lisinopril (PRINIVIL,ZESTRIL) 40 MG tablet Take 20 mg by mouth daily.        omeprazole 20 mg TbEC Take 20 mg by mouth 2 (two) times a day before meals.       pediatric multivitamin (FLINTSTONES) Chew chewable tablet Chew 2 tablets daily.       traZODone (DESYREL) 50 MG tablet Take 50 mg by mouth bedtime.       [DISCONTINUED] amLODIPine (NORVASC) 5 MG tablet Take 5 mg by mouth daily.       [DISCONTINUED] loratadine (CLARITIN) 10 mg tablet Take 10 mg by mouth daily.       [DISCONTINUED] pen needle, diabetic 31 gauge x 3/16\" Ndle Use daily with Forteo 100 each 3     [DISCONTINUED] teriparatide (FORTEO) 20 mcg/dose - 600 mcg/2.4 mL injection Inject 0.08 mL (20 mcg total) under the skin daily. 2.4 mL 5     [DISCONTINUED] tretinoin (RETIN-A) 0.025 % cream Apply topically bedtime.       No current facility-administered medications on file prior to visit.      "

## 2021-06-12 NOTE — PROGRESS NOTES
Chief Complaint   Patient presents with     PROLIA #2     1. Did you experience any problems with previous Prolia injection? NO  2. Do you feel sick today?(fever, RS, GI,  issues)? NO  3. Any medication changes in the last 6 months? NO  4. Did you take prednisone or other immunosuppressant drugs in the last 6 months?(chemo, transplant, rheu, dermatology conditions)? NO  5. Did you have any serious infection in the last 6 months? (pancreatitis) NO  6. Any recent hospitalizations/ surgeries (especially gastric bypass, thyroid, parathyroid)? NO  7. Do you plan any dental work?(especially implants and extractions) in the next 2-3 months? NO  8. Did you have any fractures in the last year? YES, RIGHT TOE, [facility], 05/2017       Next 6 month appt is made.    Jeanna Daigle [facility] 9/5/2017 11:42 AM

## 2021-06-16 NOTE — PROGRESS NOTES
1. OP (osteoporosis)  DXA Bone Density Scan    Vitamin D, Total (25-Hydroxy)    Calcium       Return in about 6 months (around 9/6/2018) for Recheck.    Patient Instructions   Prolia 3rd today.  Prolia 4th in 6 months with my nurse. I will see you in 1 year.  DXA - due now .   Phone number to schedule [phone].  Please avoid any extensive dental work as implants and teeth extractions for the next 1-2 months.  Daily calcium need is 5497-9107 mg a day from the diet and supplements.  Calcium citrate is easier to digest.  Vitamin D 2000 IU daily recommended.      Chief Complaint   Patient presents with     Osteoporosis Follow Up       /60  Pulse 62  Wt 132 lb 3.2 oz (60 kg)  BMI 24.98 kg/m2      Did you experience any problems with previous Prolia injection? no  Any medication change in the last 6 months? no  Did you take prednisone or other immunosupressant drugs in the last 6 months   (chemo, transplant, rheum, dermatology conditions)? no  Did you have any serious infection in the last 6 months?no  Any recent hospitalizations?no  Do you plan any dental work in the next 2-3 months?no  How much calcium do you take daily from the diet and supplements?1200 mg  How much vit D do you take daily? 2000 IU  Last DXA? 3/2017      Patient is here today for the 3rd Prolia injection. Patient tolerated previous injections well.   We discussed calcium and vit D daily needs today.   Next Prolia injection will be in 6 months.     15 minutes spent with the patient and more then 50 % of the time in counseling.  This note has been dictated using voice recognition software. Any grammatical or context distortions are unintentional and inherent to the software      Patient Active Problem List   Diagnosis     OP (osteoporosis)     Avitaminosis D     Vertebral compression fracture       Current Outpatient Prescriptions on File Prior to Visit   Medication Sig Dispense Refill     atorvastatin (LIPITOR) 40 MG tablet Take 20 mg by  mouth bedtime.        buPROPion (WELLBUTRIN XL) 150 MG 24 hr tablet Take 150 mg by mouth 2 (two) times a day.       calcium citrate-vitamin D3 (CITRACAL + D) 315-250 mg-unit per tablet Take 3 tablets by mouth daily.       cholecalciferol, vitamin D3, (VITAMIN D3) 2,000 unit cap Take 1,000 Units by mouth.        cyanocobalamin (VITAMIN B-12) 1000 MCG tablet Take 1,000 mcg by mouth daily.       cyclobenzaprine (FLEXERIL) 5 MG tablet        insulin NPH (NOVOLIN) 100 unit/mL injection Inject under the skin Daily before breakfast. Take 5 units in the morning       INSULIN REGULAR, HUMAN (NOVOLIN R INJ) Inject as directed. 5 units before dinner       ipratropium (ATROVENT) 0.06 % nasal spray        levothyroxine (SYNTHROID, LEVOTHROID) 112 MCG tablet Take 112 mcg by mouth daily.       lisinopril (PRINIVIL,ZESTRIL) 40 MG tablet Take 20 mg by mouth daily.        omeprazole 20 mg TbEC Take 20 mg by mouth 2 (two) times a day before meals.       pediatric multivitamin (FLINTSTONES) Chew chewable tablet Chew 2 tablets daily.       traZODone (DESYREL) 50 MG tablet Take 50 mg by mouth bedtime.       No current facility-administered medications on file prior to visit.

## 2021-07-03 NOTE — ADDENDUM NOTE
Addendum Note by Bloch, Lisa M, CMA at 3/1/2017  3:49 PM     Author: Bloch, Lisa M, CMA Service: -- Author Type: Certified Medical Assistant    Filed: 3/1/2017  3:49 PM Encounter Date: 3/1/2017 Status: Signed    : Bloch, Lisa M, CMA (Certified Medical Assistant)    Addended by: BLOCH, LISA M on: 3/1/2017 03:49 PM        Modules accepted: Orders

## 2021-07-03 NOTE — ADDENDUM NOTE
Addendum Note by Bloch, Lisa M, CMA at 3/6/2018  1:53 PM     Author: Bloch, Lisa M, CMA Service: -- Author Type: Certified Medical Assistant    Filed: 3/6/2018  1:53 PM Encounter Date: 3/6/2018 Status: Signed    : Bloch, Lisa M, CMA (Certified Medical Assistant)    Addended by: BLOCH, LISA M on: 3/6/2018 01:53 PM        Modules accepted: Orders

## 2021-11-30 ENCOUNTER — APPOINTMENT (OUTPATIENT)
Dept: CT IMAGING | Facility: CLINIC | Age: 76
End: 2021-11-30
Attending: FAMILY MEDICINE
Payer: MEDICARE

## 2021-11-30 ENCOUNTER — HOSPITAL ENCOUNTER (EMERGENCY)
Facility: CLINIC | Age: 76
Discharge: HOME | End: 2021-11-30
Attending: FAMILY MEDICINE | Admitting: FAMILY MEDICINE
Payer: MEDICARE

## 2021-11-30 VITALS
SYSTOLIC BLOOD PRESSURE: 196 MMHG | TEMPERATURE: 97.7 F | RESPIRATION RATE: 16 BRPM | HEART RATE: 71 BPM | OXYGEN SATURATION: 97 % | HEIGHT: 61 IN | DIASTOLIC BLOOD PRESSURE: 92 MMHG | BODY MASS INDEX: 25.49 KG/M2 | WEIGHT: 135 LBS

## 2021-11-30 DIAGNOSIS — E16.2 HYPOGLYCEMIA: ICD-10-CM

## 2021-11-30 DIAGNOSIS — E11.8 TYPE 2 DIABETES MELLITUS WITH COMPLICATION (H): ICD-10-CM

## 2021-11-30 DIAGNOSIS — R68.89 FORGETFULNESS: ICD-10-CM

## 2021-11-30 DIAGNOSIS — E53.8 VITAMIN B12 DEFICIENCY (NON ANEMIC): ICD-10-CM

## 2021-11-30 DIAGNOSIS — Z98.84 BARIATRIC SURGERY STATUS: ICD-10-CM

## 2021-11-30 LAB
ALBUMIN SERPL-MCNC: 3.5 G/DL (ref 3.4–5)
ALP SERPL-CCNC: 70 U/L (ref 40–150)
ALT SERPL W P-5'-P-CCNC: 27 U/L (ref 0–50)
ANION GAP SERPL CALCULATED.3IONS-SCNC: 7 MMOL/L (ref 3–14)
AST SERPL W P-5'-P-CCNC: 22 U/L (ref 0–45)
BASOPHILS # BLD AUTO: 0.1 10E3/UL (ref 0–0.2)
BASOPHILS NFR BLD AUTO: 1 %
BILIRUB SERPL-MCNC: 0.4 MG/DL (ref 0.2–1.3)
BUN SERPL-MCNC: 16 MG/DL (ref 7–30)
CALCIUM SERPL-MCNC: 9.2 MG/DL (ref 8.5–10.1)
CHLORIDE BLD-SCNC: 108 MMOL/L (ref 94–109)
CO2 SERPL-SCNC: 29 MMOL/L (ref 20–32)
CREAT SERPL-MCNC: 0.6 MG/DL (ref 0.52–1.04)
EOSINOPHIL # BLD AUTO: 0.1 10E3/UL (ref 0–0.7)
EOSINOPHIL NFR BLD AUTO: 2 %
ERYTHROCYTE [DISTWIDTH] IN BLOOD BY AUTOMATED COUNT: 13.7 % (ref 10–15)
GFR SERPL CREATININE-BSD FRML MDRD: 88 ML/MIN/1.73M2
GLUCOSE BLD-MCNC: 141 MG/DL (ref 70–99)
GLUCOSE BLDC GLUCOMTR-MCNC: 180 MG/DL (ref 70–99)
GLUCOSE BLDC GLUCOMTR-MCNC: 42 MG/DL (ref 70–99)
HCT VFR BLD AUTO: 45.6 % (ref 35–47)
HGB BLD-MCNC: 14.5 G/DL (ref 11.7–15.7)
IMM GRANULOCYTES # BLD: 0 10E3/UL
IMM GRANULOCYTES NFR BLD: 0 %
LYMPHOCYTES # BLD AUTO: 2.9 10E3/UL (ref 0.8–5.3)
LYMPHOCYTES NFR BLD AUTO: 48 %
MCH RBC QN AUTO: 28.8 PG (ref 26.5–33)
MCHC RBC AUTO-ENTMCNC: 31.8 G/DL (ref 31.5–36.5)
MCV RBC AUTO: 91 FL (ref 78–100)
MONOCYTES # BLD AUTO: 0.6 10E3/UL (ref 0–1.3)
MONOCYTES NFR BLD AUTO: 11 %
NEUTROPHILS # BLD AUTO: 2.3 10E3/UL (ref 1.6–8.3)
NEUTROPHILS NFR BLD AUTO: 38 %
NRBC # BLD AUTO: 0 10E3/UL
NRBC BLD AUTO-RTO: 0 /100
PLATELET # BLD AUTO: 224 10E3/UL (ref 150–450)
POTASSIUM BLD-SCNC: 3.7 MMOL/L (ref 3.4–5.3)
PROT SERPL-MCNC: 7.6 G/DL (ref 6.8–8.8)
RBC # BLD AUTO: 5.04 10E6/UL (ref 3.8–5.2)
SODIUM SERPL-SCNC: 144 MMOL/L (ref 133–144)
T4 FREE SERPL-MCNC: 0.99 NG/DL (ref 0.76–1.46)
TSH SERPL DL<=0.005 MIU/L-ACNC: 4.2 MU/L (ref 0.4–4)
WBC # BLD AUTO: 6.1 10E3/UL (ref 4–11)

## 2021-11-30 PROCEDURE — 93010 ELECTROCARDIOGRAM REPORT: CPT | Performed by: FAMILY MEDICINE

## 2021-11-30 PROCEDURE — 93005 ELECTROCARDIOGRAM TRACING: CPT | Performed by: FAMILY MEDICINE

## 2021-11-30 PROCEDURE — 99285 EMERGENCY DEPT VISIT HI MDM: CPT | Mod: 25 | Performed by: FAMILY MEDICINE

## 2021-11-30 PROCEDURE — 99284 EMERGENCY DEPT VISIT MOD MDM: CPT | Mod: 25 | Performed by: FAMILY MEDICINE

## 2021-11-30 PROCEDURE — 84443 ASSAY THYROID STIM HORMONE: CPT | Performed by: FAMILY MEDICINE

## 2021-11-30 PROCEDURE — 80053 COMPREHEN METABOLIC PANEL: CPT | Performed by: FAMILY MEDICINE

## 2021-11-30 PROCEDURE — 36415 COLL VENOUS BLD VENIPUNCTURE: CPT | Performed by: FAMILY MEDICINE

## 2021-11-30 PROCEDURE — 82607 VITAMIN B-12: CPT | Performed by: FAMILY MEDICINE

## 2021-11-30 PROCEDURE — 85025 COMPLETE CBC W/AUTO DIFF WBC: CPT | Performed by: FAMILY MEDICINE

## 2021-11-30 PROCEDURE — 84439 ASSAY OF FREE THYROXINE: CPT | Performed by: FAMILY MEDICINE

## 2021-11-30 PROCEDURE — 70450 CT HEAD/BRAIN W/O DYE: CPT

## 2021-11-30 RX ORDER — TRAZODONE HYDROCHLORIDE 50 MG/1
1 TABLET, FILM COATED ORAL AT BEDTIME
COMMUNITY
Start: 2021-08-27 | End: 2024-02-17

## 2021-11-30 RX ORDER — LEVOTHYROXINE SODIUM 100 UG/1
1 TABLET ORAL DAILY
COMMUNITY
Start: 2021-04-30

## 2021-11-30 ASSESSMENT — ENCOUNTER SYMPTOMS
PALPITATIONS: 0
BLOOD IN STOOL: 0
COUGH: 0
FREQUENCY: 0
SORE THROAT: 0
ABDOMINAL PAIN: 0
SHORTNESS OF BREATH: 0
NAUSEA: 0
CONSTIPATION: 0
HEADACHES: 0
SINUS PRESSURE: 0
FEVER: 0
VOMITING: 0
CHILLS: 0
CONFUSION: 1
DIAPHORESIS: 0
DYSURIA: 0
WHEEZING: 0
DIARRHEA: 0

## 2021-11-30 ASSESSMENT — MIFFLIN-ST. JEOR: SCORE: 1029.74

## 2021-11-30 NOTE — LETTER
December 3, 2021      Marcia Lawrence  [address] [place] [id number]        Dear MsSoha[facility]arinel[facility]hen,    We are writing to inform you of your test results.    Can continue on oral supplementation but would take every other day or reduce to 500 mcg daily.      Resulted Orders   Vitamin B12   Result Value Ref Range    Vitamin B12 1,293 (H) 193 - 986 pg/mL       If you have any questions or concerns, please call the clinic at the number listed above.       Sincerely,      Hussain Chavarria MD

## 2021-12-01 ENCOUNTER — DOCUMENTATION ONLY (OUTPATIENT)
Dept: ADMINISTRATIVE | Facility: CLINIC | Age: 76
End: 2021-12-01
Payer: MEDICARE

## 2021-12-01 ENCOUNTER — PATIENT OUTREACH (OUTPATIENT)
Dept: FAMILY MEDICINE | Facility: CLINIC | Age: 76
End: 2021-12-01
Payer: MEDICARE

## 2021-12-01 LAB — VIT B12 SERPL-MCNC: 1293 PG/ML (ref 193–986)

## 2021-12-01 NOTE — DISCHARGE INSTRUCTIONS
ICD-10-CM    1. Forgetfulness  R68.89 Occupational Therapy Referral    no new symptoms are present to warrant imaging and labs have been recent.  Glucose here of 175 tells me that blood sugar is not low.  I would recommend routine MRI brain, occupation therapy for SLUMS exam, repeat labs as per endo and recheck B12, and follow-up neurology.  avoid driving for now as much as is possible, jero at night.  continue frequent blood sugar checks.   2. Vitamin B12 deficiency (non anemic)  E53.8    3. Type 2 diabetes mellitus with complication (H)  E11.8    4. Bariatric surgery status (GASTRIC BYPASS)  Z98.84     other vitamin deficiency is possible after bariatric surgery.  please discuss with endo.  B12 needs repoeat testing as well.  consider thiamine 100 mg orally (OTC) daily given poor diet and bariatric surgery history for 2 weeks.   5. Hypoglycemia  E16.2     continue to check blood sugar,.s discuss these low blood sugars with Dr. King.  observe for any fever or signs infection.  tomorrow recheckl with Dr. King's office on the correct dosing insulin and write this down. also them know about the lows.

## 2021-12-01 NOTE — ED NOTES
"Pt feels like \"my blood sugar is low.\"  Blood sugar 42.  Pt eating hamburger and drinking apple juice.  MD aware.  "

## 2021-12-01 NOTE — ED NOTES
I went in to discharge the patient. Patient is refusing to leave stating she wants her imaging and her B12 injection. Patient is not currently on B12 injections and I was at bedside when MD was talking to patient about following up with endocrinologist regarding B12 since she is currently on oral replacement. Also went over instructions for outpatient imagine orders per MD and that she should get an MRI as an outpatient but that is not something that we will be doing tonight in the ER. Patient still refusing to leave without imaging and B12. MD updated again and will go talk to patient.

## 2021-12-01 NOTE — ED PROVIDER NOTES
History     Chief Complaint   Patient presents with     Altered Mental Status     Pt states she is confused and she needs a B12 shot. Doesn't know how long she's been confused. No other symptoms. Takes B12 orally, goes to [facility]. Daughter states that she sounds different than she normally does and is more confused than normal     HPI  Marcia Lawrence is a 76 year old female who presents with history of bariatric surgery, type 2 diabetes, hypertension presents because she would like a B12 injection.        She has been noted by family to be periodically and gradually confused at times.  She has not been aware of this.  She does admit to being forgetful.  She is accompanied by one of her daughters who has not seen significant changes but another daughter who spends more time with her is noted that she forgets sometimes to eat, she can be forgetful about her insulin.  However the patient can tell me that she checks her blood sugar 4 times a day.  She notes occasional low blood sugars.  She checked her blood sugar in front of me today and it was 175.  She is not on anticoagulants.  She did have she tells me a fall in which she landed on her buttocks about a week ago.  No head injury or neck injury.  No other injuries were sustained.  This occurred when she was transferring to go up steps.  She often uses a cane at home but she was not using a cane at that time.  She feels no incoordination.  She has had no cardiopulmonary symptoms such as chest pain or shortness of breath.    The timing of her forgetfulness appears to have been over the course of months.  When I go back and look it prior history she had a lot of the lab testing she tells me that was done relatively recently was actually in May.  She has been taking B12 since that time.  The level was around 200.  She also had lab testing in September.  Her A1c at the time was 6.9.  Did have a glucose at the time of 56 on that prior check.  She is followed by  endocrinology and will be seeing them in the next couple of weeks as follow-up.    She came in tonight primarily for 1 thing which was to obtain a B12 shot.  She is taking oral B12.  Her level has not been done since the spring.  He has no acute neurologic changes.  There is been no headache or vision change.  No changes in speech or swallowing.  No weakness or sensory changes in the extremities.  No incoordination.  No head injuries.  No seizures.    She is status post bariatric surgery.      Allergies:  Allergies   Allergen Reactions     Percocet [Oxycodone-Acetaminophen] Other (See Comments)     Delirium      Aspirin Other (See Comments)     Gastric bypass     Cefuroxime Swelling     Pcn [Penicillins] Hives     Sulfa Drugs Rash       Problem List:    Patient Active Problem List    Diagnosis Date Noted     Acute midline low back pain with sciatica, sciatica laterality unspecified 09/30/2016     Priority: Medium     Essential hypertension with goal blood pressure less than 140/90 09/09/2016     Priority: Medium     Adjustment disorder with depressed mood 03/15/2016     Priority: Medium     Type 2 diabetes mellitus with complication (H) 10/24/2015     Priority: Medium     Intestinal malabsorption 10/17/2014     Priority: Medium     Problem list name updated by automated process. Provider to review       Lumbar compression fracture (H) 10/17/2014     Priority: Medium     9/29 mri        Age-related osteoporosis with current pathological fracture 10/17/2014     Priority: Medium     Complicated by bariatric surgery h.o          Bariatric surgery status (GASTRIC BYPASS) 08/18/2014     Priority: Medium     2008         Hyperlipidemia LDL goal <100 08/18/2014     Priority: Medium     Health Care Home 08/14/2014     Priority: Medium     State Tier Level:    Status:  Declined  Care Coordinator:  Mallorie Roman    See Letters for [facility] Care Plan  Date:  December 3, 2015             Advanced directives, counseling/discussion  07/29/2014     Priority: Medium     Adjustment disorder with mixed anxiety and depressed mood 07/29/2014     Priority: Medium     Compression fracture of L1 lumbar vertebra (H) 07/28/2014     Priority: Medium     Esophageal reflux 11/09/2012     Priority: Medium     PROXIMAL LEFT HUMERUS FRACTURE 12/03/2007     Priority: Medium        Past Medical History:    Past Medical History:   Diagnosis Date     Arthritis      Depression      Diabetes (H)      Gastro-oesophageal reflux disease      HLD (hyperlipidaemia)      Hypertension      Osteoporosis      Other chronic pain      Sebaceous cyst      Senile osteoporosis      Thyroid disease        Past Surgical History:    Past Surgical History:   Procedure Laterality Date     GASTRIC BYPASS       hernia repair         Family History:    Family History   Problem Relation Age of Onset     Diabetes Maternal Grandmother      Diabetes Maternal Grandfather        Social History:  Marital Status:   [5]  Social History     Tobacco Use     Smoking status: Never Smoker     Smokeless tobacco: Never Used   Substance Use Topics     Alcohol use: No     Drug use: No        Medications:    acetaminophen (TYLENOL) 325 MG tablet  atorvastatin (LIPITOR) 40 MG tablet  buPROPion (WELLBUTRIN SR) 150 MG 12 hr tablet  CALCIUM CITRATE PO  Cholecalciferol (VITAMIN D3 PO)  insulin NPH (HUMULIN N VIAL) 100 UNIT/ML susp  insulin regular (NOVOLIN R VIAL) 100 UNIT/ML VIAL  levothyroxine (SYNTHROID/LEVOTHROID) 100 MCG tablet  lisinopril (PRINIVIL,ZESTRIL) 40 MG tablet  multivitamin  peds with iron (FLINTSTONES COMPLETE) 60 MG chewable tablet  omeprazole (PRILOSEC) 20 MG capsule  traZODone (DESYREL) 50 MG tablet  vitamin B-12 (CYANOCOBALAMIN) 1000 MCG tablet  Continuous Blood Gluc  (FREESTYLE SARA 2 READER) PETE  order for DME          Review of Systems   Constitutional: Negative for chills, diaphoresis and fever.   HENT: Negative for ear pain, sinus pressure and sore throat.    Eyes:  "Negative for visual disturbance.   Respiratory: Negative for cough, shortness of breath and wheezing.    Cardiovascular: Negative for chest pain and palpitations.   Gastrointestinal: Negative for abdominal pain, blood in stool, constipation, diarrhea, nausea and vomiting.   Genitourinary: Negative for dysuria, frequency and urgency.   Skin: Negative for rash.   Neurological: Negative for headaches.   Psychiatric/Behavioral: Positive for confusion.   All other systems reviewed and are negative.      Physical Exam   BP: (!) 213/77  Pulse: 68  Temp: 97.7  F (36.5  C)  Resp: 16  Height: 154.9 cm (5' 1\")  Weight: 61.2 kg (135 lb) (stated)  SpO2: 97 %      Physical Exam  Constitutional:       General: She is in acute distress.   HENT:      Head: Atraumatic.   Eyes:      Conjunctiva/sclera: Conjunctivae normal.   Cardiovascular:      Rate and Rhythm: Normal rate and regular rhythm.      Heart sounds: No murmur heard.      Pulmonary:      Effort: No respiratory distress.      Breath sounds: No stridor. No wheezing or rhonchi.   Abdominal:      General: Abdomen is flat. There is no distension.      Palpations: There is no mass.      Tenderness: There is no abdominal tenderness. There is no guarding.   Musculoskeletal:      Cervical back: Neck supple.      Right lower leg: No edema.      Left lower leg: No edema.   Skin:     Coloration: Skin is not pale.      Findings: No rash.   Neurological:      General: No focal deficit present.      Mental Status: She is alert. She is disoriented.      GCS: GCS eye subscore is 4. GCS verbal subscore is 5. GCS motor subscore is 6.      Cranial Nerves: No cranial nerve deficit or dysarthria.      Sensory: No sensory deficit.      Motor: No weakness, tremor or abnormal muscle tone.      Coordination: Coordination is intact. Romberg sign negative. Coordination normal. Finger-Nose-Finger Test normal.      Gait: Gait normal.       Patient's speech is fluid.  She has what appears to be normal " mentation.  She is fully oriented.  She can answer my questions appropriately.  I see no acute deficits.    ED Course        Procedures           EKG Interpretation:      Interpreted by Hussain Chavarria MD  EKG done at 2232 hrs. demonstrates a sinus rhythm at 78 bpm with a left axis.  No ST change.  No T wave changes.  Poor R progression V1 through V3.  No ectopy.  Normal conduction.  Normal intervals.  Impression sinus rhythm 78 bpm and no acute change.              Critical Care time:  none               Results for orders placed or performed during the hospital encounter of 11/30/21 (from the past 24 hour(s))   Glucose by meter   Result Value Ref Range    GLUCOSE BY METER POCT 42 (LL) 70 - 99 mg/dL   CBC with platelets differential    Narrative    The following orders were created for panel order CBC with platelets differential.  Procedure                               Abnormality         Status                     ---------                               -----------         ------                     CBC with platelets and d...[[id number]]                      Final result                 Please view results for these tests on the individual orders.   Comprehensive metabolic panel   Result Value Ref Range    Sodium 144 133 - 144 mmol/L    Potassium 3.7 3.4 - 5.3 mmol/L    Chloride 108 94 - 109 mmol/L    Carbon Dioxide (CO2) 29 20 - 32 mmol/L    Anion Gap 7 3 - 14 mmol/L    Urea Nitrogen 16 7 - 30 mg/dL    Creatinine 0.60 0.52 - 1.04 mg/dL    Calcium 9.2 8.5 - 10.1 mg/dL    Glucose 141 (H) 70 - 99 mg/dL    Alkaline Phosphatase 70 40 - 150 U/L    AST 22 0 - 45 U/L    ALT 27 0 - 50 U/L    Protein Total 7.6 6.8 - 8.8 g/dL    Albumin 3.5 3.4 - 5.0 g/dL    Bilirubin Total 0.4 0.2 - 1.3 mg/dL    GFR Estimate 88 >60 mL/min/1.73m2   TSH with free T4 reflex   Result Value Ref Range    TSH 4.20 (H) 0.40 - 4.00 mU/L   CBC with platelets and differential   Result Value Ref Range    WBC Count 6.1 4.0 - 11.0 10e3/uL    RBC Count  5.04 3.80 - 5.20 10e6/uL    Hemoglobin 14.5 11.7 - 15.7 g/dL    Hematocrit 45.6 35.0 - 47.0 %    MCV 91 78 - 100 fL    MCH 28.8 26.5 - 33.0 pg    MCHC 31.8 31.5 - 36.5 g/dL    RDW 13.7 10.0 - 15.0 %    Platelet Count 224 150 - 450 10e3/uL    % Neutrophils 38 %    % Lymphocytes 48 %    % Monocytes 11 %    % Eosinophils 2 %    % Basophils 1 %    % Immature Granulocytes 0 %    NRBCs per 100 WBC 0 <1 /100    Absolute Neutrophils 2.3 1.6 - 8.3 10e3/uL    Absolute Lymphocytes 2.9 0.8 - 5.3 10e3/uL    Absolute Monocytes 0.6 0.0 - 1.3 10e3/uL    Absolute Eosinophils 0.1 0.0 - 0.7 10e3/uL    Absolute Basophils 0.1 0.0 - 0.2 10e3/uL    Absolute Immature Granulocytes 0.0 <=0.4 10e3/uL    Absolute NRBCs 0.0 10e3/uL   T4 free   Result Value Ref Range    Free T4 0.99 0.76 - 1.46 ng/dL   Glucose by meter   Result Value Ref Range    GLUCOSE BY METER POCT 180 (H) 70 - 99 mg/dL   Head CT w/o contrast    Narrative    EXAM: CT HEAD W/O CONTRAST  LOCATION: [facility]  DATE/TIME: 11/30/2021 10:38 PM    INDICATION: Mental status change. Unknown cause. Confusion.  COMPARISON: 05/06/2016.  TECHNIQUE: Routine CT Head without IV contrast. Multiplanar reformats. Dose reduction techniques were used.    FINDINGS:  INTRACRANIAL CONTENTS: No intracranial hemorrhage, extraaxial collection, or mass effect.  No CT evidence of acute infarct. Mild presumed chronic small vessel ischemic changes. Mild to moderate generalized volume loss. No hydrocephalus.     VISUALIZED ORBITS/SINUSES/MASTOIDS: Prior bilateral cataract surgery. Visualized portions of the orbits are otherwise unremarkable. Mild mucosal thickening scattered about the paranasal sinuses. No middle ear or mastoid effusion.    BONES/SOFT TISSUES: No acute abnormality.      Impression    IMPRESSION:  1.  No CT evidence for acute intracranial process.  2.  Brain atrophy and presumed chronic microvascular ischemic changes as above.       Medications - No data to  display    Assessments & Plan (with Medical Decision Making)     MDM: Marcia YOUNGER Sonmor is a 76 year old female who presents for getting a B12 injection.  This is because family members have noticed that she has been more forgetful over time.  This has not been acute.  She presents with no acute neurologic deficits and has a reassuring neurologic exam.  In essence it appears that she is presenting for a evaluation for possible dementia to the emergency department.  However there are no acute findings suggestive of delirium, new trauma, seizure, or other acute neurologic events.  She does have a history of type 2 diabetes on insulin and she has demonstrated some low blood sugars in the past on prior testing but is able to clearly retest her blood sugar here in front of me 175 on her meter.  She is adapted doing the level.  She answers my questions appropriately.  She has a reassuring neurologic exam as well.    She has had some evaluation by primary clinic for memory changes she tells me.  I can see that she had a B12 level done in the spring 2021 and this was low at roughly 199.  Has been taking B12 vitamin supplement.  She has had also bariatric surgery and this can result in a little thiamine as well as other vitamin deficiencies.  She sees endocrinology.  She does not use alcohol or tobacco.  Diet apparently is lacking per her daughter.    We discussed that I could perform testing today which would include electrolytes blood count B12 level possibly thyroid if it has been sometime since his last draw.  CT head imaging would be unlikely to be yielding but I did offer that we could do it tonight however an MRI of her brain would be appropriate and could be done routinely out of the emergency department setting.  We also discussed formal testing for memory including slums exam by occupational therapy, follow-up neurology.  We discussed exercising caution, staying with family.    We discussed that I would check the B12  level and if it was low when it returns in the next couple of days her primary provider could consider whether B12 injection might be appropriate.  I typically do not use the B12 IM injection as typically oral at the 1000 mcg is effective, but with her history of bariatric surgery if her level was still low despite taking oral replacement it might be considered.    Also discussed that I recommended she have a complete evaluation in clinic for memory and not pinning everything on B12.    Initially I had completed my evaluation with them recommended follow-up and she agreed to this but then I was called back in the room because she did not want to leave until she got a B12 injection.  We later discussed checking the level today.  I offered to check other laboratory testing and she would not like to have that done but would test in the future.  I have recommended that all of her testing being done at the same time when she sees endocrinology in the next couple of weeks.  She really would like to see the B12 level only at this time.      However, while in the emergency department and getting ready for discharge home, the patient noted that she started to feel somewhat shaky as if she was running a low blood sugar and she checked her blood sugar on meter and it was now 44.  It dropped from the original 175.  She had not taken insulin in the interim.  She is not on sulfonylureas.  She takes NPH 5 units and NPH Novolin in the a.m.  It is unclear to me when her last dose was.  When asked about the evening doses of her insulin she said arm still confused.  She was eating a sandwich at the time and her blood sugar had just been low.  I recommended therefore that we do go forward with the acute neurologic change imaging.  sHe has no focal findings.    Subsequent blood sugars in the emergency department were reassuring.  We discussed the importance of preventing hypoglycemia.  She will be around family members tonight and into  the next couple of days.  Discussed avoiding driving right now.  Discussed calling Dr. King about the lower blood sugars.  one option would be is to hold the Novolin and use only the NPH.  This would be less likely to cause low blood sugars.  Noticed that her A1c is 6.9 and aiming for target of less than 8 may be appropriate.  Suspect she is running low blood sugars at other times.  We reviewed recent infectious symptoms.  She really has had none.  She told me that yesterday she had mild congestion.  This seemed to resolve.  She has no respiratory symptoms today.  She is afebrile here.  She has no dysuria urgency frequency.  We discussed any infectious symptoms should prompt further evaluation as this may be triggering her low blood sugar.      I have reviewed the nursing notes.    I have reviewed the findings, diagnosis, plan and need for follow up with the patient.       New Prescriptions    No medications on file       Final diagnoses:   Forgetfulness - no new symptoms are present to warrant imaging and labs have been recent.  Glucose here of 175 tells me that blood sugar is not low.  I would recommend routine MRI brain, occupation therapy for SLUMS exam, repeat labs as per liana and recheck B12, and follow-up neurology.  avoid driving for now as much as is possible, jero at night.  continue frequent blood sugar checks.   Vitamin B12 deficiency (non anemic)   Type 2 diabetes mellitus with complication (H)   Bariatric surgery status (GASTRIC BYPASS) - other vitamin deficiency is possible after bariatric surgery.  please discuss with endo.  B12 needs repoeat testing as well.  consider thiamine 100 mg orally (OTC) daily given poor diet and bariatric surgery history for 2 weeks.       11/30/2021   [facility] EMERGENCY DEPT     Hussain Chavarria MD  11/30/21 2023       Hussain Chavarria MD  11/30/21 [id number]

## 2021-12-02 ENCOUNTER — HOSPITAL ENCOUNTER (OUTPATIENT)
Dept: OCCUPATIONAL THERAPY | Facility: CLINIC | Age: 76
Setting detail: THERAPIES SERIES
End: 2021-12-02
Attending: FAMILY MEDICINE
Payer: MEDICARE

## 2021-12-02 ENCOUNTER — TELEPHONE (OUTPATIENT)
Dept: FAMILY MEDICINE | Facility: CLINIC | Age: 76
End: 2021-12-02
Payer: MEDICARE

## 2021-12-02 PROCEDURE — 97165 OT EVAL LOW COMPLEX 30 MIN: CPT | Mod: GO

## 2021-12-02 PROCEDURE — 97535 SELF CARE MNGMENT TRAINING: CPT | Mod: GO

## 2021-12-02 ASSESSMENT — ACTIVITIES OF DAILY LIVING (ADL): IADL_QUICK_ADDS: MEAL PLANNING/PREPARATION;HOME/FINANCIAL/MANAGEMENT;COMMUNITY MOBILITY

## 2021-12-02 NOTE — PROGRESS NOTES
Therapy Services were provided by the student with ZARA Hdadad guiding and directing the services and providing the skilled judgement and assessment throughout the session.        12/02/21 0900   Quick Adds   Type of Visit Initial Outpatient Occupational Therapy Evaluation       Present No   General Information   Start Of Care Date 12/02/21   Referring Physician Hussain Chavarria   Orders Evaluate and treat as indicated   Orders Date 11/30/21   Onset of Illness/Injury or Date of Surgery 11/02/21   Precautions/Limitations No known precautions/limitations   Surgical/Medical History Reviewed Yes   Comments/Observations Daughter Kate present for evaluation.    Role/Living Environment   Current Community Support   (pt lives alone. )   Patient role/Employment history Retired   Current Living Environment [facility]   Number of Stairs to Enter Home 0   Number of Stairs Within Home 0   Primary Bathroom Location/Comments 1 level living.    Primary Bathroom Set Up/Equipment Tub grab bar   Prior Level - Transfers Independent   Prior Level - Ambulation Assistive equipment  (4 point cane. at times uses RW )   Prior Level - ADLS Independent   Prior Responsibilities - IADL Meal Preparation;Housekeeping;Laundry;Shopping;Medication management;Finances;Driving   Prior Level Comments daughter assists with finances at baseline.    Current Assistive Devices - Mobility Quad cane   Current Assistive Devices - ADL   (none)   Role/Living Environment [facility] [place] daughter reports that Marcia has difficult time keeping her home clean, although her grandson's girlfriend will stop by to help with housekeeping tasks. Marcia has a pet cat.    Patient/family Goals Statement Family and patient would like to know where things are at for her memory and cognition. Would like guidance on tips/techniques for medication compliance.    Pain   Patient currently in pain No   Abuse Screen (yes  response referral indicated)   Feels Unsafe at Home or Work/School no   Feels Threatened by Someone no   Does Anyone Try to Keep You From Having Contact with Others or Doing Things Outside Your Home? no   Physical Signs of Abuse Present no   Cognitive Status Examination   Orientation Orientation to person, place and time   Level of Consciousness Alert   Follows Commands and Answers Questions 100% of the time   Personal Safety and Judgment Intact   Memory Impaired   Attention No deficits were identified   Executive Function Working memory impaired, decreased storage of information for performing tasks   Cognitive Comment Client score of 21/30 on the SLUMS indicates mild cogntive impairment. She particularly struggled with delayed recall of words. Client scored within the average range in all areas of cognition on the Cognistat, although she scored 4/8 in reasoning/executive functioning domain indicating mild impairment in this area. Client scored 40 on the SDMT (per driving screen >25 is WNL).   Visual Perception   Visual Perception Wears glasses   Functional Mobility   Functional Mobility Comments ambulates into clinic with modified independence using quad cane.    Mobility   Bed Mobility Comments reports independent    Transfer Skills   Transfer Comments independent    Transfer Skill   Level of Dale: Transfers independent   Toilet Transfer   Toilet Transfer Comments independent    Tub/Shower Transfer   Tub/Shower Transfer Comments independent.    Bathing   Bathing Comments reports independent    Upper Body Dressing   Level of Dale: Dress Upper Body independent   Lower Body Dressing   Level of Dale: Dress Lower Body independent   Toileting   Level of Dale: Toilet independent   Grooming   Level of Dale: Grooming independent   Eating/Self-Feeding   Level of Dale: Eating independent   Instrumental Activities of Daily Living Assessment   IADL Quick Adds Meal  Planning/Preparation;Home/Financial/Management;Community Mobility   Meal Planning/Preparation uses both stove, oven and microwave- denies any concerns with this task.    Home/Financial Management Daughter assists with bills/money management.    Community Mobility Pt does drive- states she has gotten lost at night before. States MD told her not to drive at night any longer.    Planned Therapy Interventions   Planned Therapy Interventions ADL training;Cognitive skills;Cognitive performance testing   Adult OT Eval Goals   OT Eval Goals (Adult) 1;2    OT Goal 1   Goal Identifier Cognition   Goal Description Marcia and family members will verbalize understanding of cognitive assessment results and implications for IADLs.   Goal Progress Initiated   Target Date 01/01/22    OT Goal 2   Goal Identifier Medication Management   Goal Description Marcia will demonstrate understanding of memory techinques to manage her insulin through identifying and implementing at least 2 strategies she will consistently use.   Goal Progress Initiated   Target Date 01/01/22   Clinical Impression   Criteria for Skilled Therapeutic Interventions Met Yes, treatment indicated   OT Diagnosis Memory deficits impacting IADL safety   Influenced by the following impairments memory   Assessment of Occupational Performance 1-3 Performance Deficits   Identified Performance Deficits medication management, driving, financial management   Clinical Decision Making (Complexity) Moderate complexity   Therapy Frequency 1x/week    Predicted Duration of Therapy Intervention (days/wks) x4 weeks   Risks and Benefits of Treatment have been explained. Yes   Patient, Family & other staff in agreement with plan of care Yes   Clinical Impression Comments Mild cognitive impairment and working memory deficits impact client ability to safely complete IADLs, including driving at night, managing her insulin, and managing her finances. Recommend OT for education on memory  techniques and further cogntive assessment.   Total Evaluation Time   OT Eval, Low Complexity Minutes ([id number]) 40

## 2021-12-02 NOTE — TELEPHONE ENCOUNTER
Daughter and pt called regarding message received this am regarding b12. Gave info that b12 is elevated and to change dose to every other day.    Adrien Davis RN

## 2021-12-06 NOTE — TELEPHONE ENCOUNTER
"  ED for acute condition Discharge Protocol    \"Hi, my name is Tamie Madison RN, a registered nurse, and I am calling from [facility].  I am calling to follow up and see how things are going for you after your recent emergency visit.\"    Tell me how you are doing now that you are home?\" have a stomach ache      Discharge Instructions    \"Let's review your discharge instructions.  What is/are the follow-up recommendations?  Pt. Response: none    \"Has an appointment with your primary care provider been scheduled?\"  No (needed - schedule appointment and remind to bring meds)    Medications    \"Tell me what changed about your medicines when you discharged?\"    None      \"What questions do you have about your medications?\"   None        Call Summary    \"What questions or concerns do you have about your recent visit and your follow-up care?\"     none    \"If you have questions or things don't continue to improve, we encourage you contact us through the main clinic number (give number).  Even if the clinic is not open, triage nurses are available 24/7 to help you.     We would like you to know that our clinic has extended hours (provide information).  We also have urgent care (provide details on closest location and hours/contact info)\"    \"Thank you for your time and take care!\"                "

## 2021-12-13 NOTE — PLAN OF CARE
[facility]          OUTPATIENT OCCUPATIONAL THERAPY  EVALUATION  PLAN OF TREATMENT FOR OUTPATIENT REHABILITATION  (COMPLETE FOR INITIAL CLAIMS ONLY)  Patient's Last Name, First Name, M.I.  YOB: 1945  Marcia Lawrence                        Provider's Name  [facility] Medical Record No.  [id number]                               Onset Date:     11/02/21   Start of Care Date:     12/02/21   Type:     ___PT   _X_OT   ___SLP Medical Diagnosis:   cognitive changes                             OT Diagnosis:     Memory deficits impacting IADL safety Visits from SOC:  1   _________________________________________________________________________________  Plan of Treatment/Functional Goals:  ADL training,Cognitive skills,Cognitive performance testing                    Goals  Goal Identifier: Cognition  Goal Description: Marcia and family members will verbalize understanding of cognitive assessment results and implications for IADLs.  Target Date: 01/01/22     Goal Identifier: Medication Management  Goal Description: Marcia will demonstrate understanding of memory techinques to manage her insulin through identifying and implementing at least 2 strategies she will consistently use.  Target Date: 01/01/22                                                                                  Therapy Frequency: 1x/week      Predicted Duration of Therapy Intervention (days/wks): x4 weeks (Simultaneous filing. User may not have seen previous data.)  Maryjane Cm OT          I CERTIFY THE NEED FOR THESE SERVICES FURNISHED UNDER        THIS PLAN OF TREATMENT AND WHILE UNDER MY CARE     (Physician co-signature of this document indicates review and certification of the therapy plan).               ,     ,                 Referring Physician: Hussain Chavarria     Initial Assessment        See Epic  Evaluation      Start Of Care Date: 12/02/21

## 2024-02-17 ENCOUNTER — APPOINTMENT (OUTPATIENT)
Dept: CT IMAGING | Facility: CLINIC | Age: 79
DRG: 200 | End: 2024-02-17
Attending: EMERGENCY MEDICINE
Payer: MEDICARE

## 2024-02-17 ENCOUNTER — HOSPITAL ENCOUNTER (INPATIENT)
Facility: CLINIC | Age: 79
LOS: 2 days | Discharge: HOME HEALTH | DRG: 200 | End: 2024-02-19
Attending: EMERGENCY MEDICINE | Admitting: FAMILY MEDICINE
Payer: MEDICARE
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
## 2024-02-17 DIAGNOSIS — I63.9 CEREBROVASCULAR ACCIDENT (CVA), UNSPECIFIED MECHANISM (H): ICD-10-CM

## 2024-02-17 DIAGNOSIS — S22.42XA CLOSED FRACTURE OF MULTIPLE RIBS OF LEFT SIDE, INITIAL ENCOUNTER: ICD-10-CM

## 2024-02-17 DIAGNOSIS — R53.1 LEFT-SIDED WEAKNESS: ICD-10-CM
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
## 2024-02-17 PROBLEM — F41.9 ANXIETY: Status: ACTIVE | Noted: 2017-01-23

## 2024-02-17 LAB
ALLEN'S TEST: YES
ANION GAP SERPL CALCULATED.3IONS-SCNC: 11 MMOL/L (ref 7–15)
BASE EXCESS BLDA CALC-SCNC: 3.8 MMOL/L (ref -3–3)
BASOPHILS # BLD AUTO: 0 10E3/UL (ref 0–0.2)
BASOPHILS NFR BLD AUTO: 0 %
BUN SERPL-MCNC: 19.8 MG/DL (ref 8–23)
CALCIUM SERPL-MCNC: 9.3 MG/DL (ref 8.8–10.2)
CHLORIDE SERPL-SCNC: 101 MMOL/L (ref 98–107)
COHGB MFR BLD: 95.3 % (ref 95–96)
CREAT SERPL-MCNC: 0.77 MG/DL (ref 0.51–0.95)
DEPRECATED HCO3 PLAS-SCNC: 25 MMOL/L (ref 22–29)
EGFRCR SERPLBLD CKD-EPI 2021: 77 ML/MIN/1.73M2
EOSINOPHIL # BLD AUTO: 0.1 10E3/UL (ref 0–0.7)
EOSINOPHIL NFR BLD AUTO: 2 %
ERYTHROCYTE [DISTWIDTH] IN BLOOD BY AUTOMATED COUNT: 12.7 % (ref 10–15)
GLUCOSE BLDC GLUCOMTR-MCNC: 128 MG/DL (ref 70–99)
GLUCOSE BLDC GLUCOMTR-MCNC: 134 MG/DL (ref 70–99)
GLUCOSE BLDC GLUCOMTR-MCNC: 138 MG/DL (ref 70–99)
GLUCOSE BLDC GLUCOMTR-MCNC: 199 MG/DL (ref 70–99)
GLUCOSE SERPL-MCNC: 191 MG/DL (ref 70–99)
HBA1C MFR BLD: 7.1 %
HCO3 BLD-SCNC: 28 MMOL/L (ref 21–28)
HCT VFR BLD AUTO: 38.2 % (ref 35–47)
HGB BLD-MCNC: 11.5 G/DL (ref 11.7–15.7)
HGB BLD-MCNC: 12.8 G/DL (ref 11.7–15.7)
IMM GRANULOCYTES # BLD: 0 10E3/UL
IMM GRANULOCYTES NFR BLD: 0 %
LYMPHOCYTES # BLD AUTO: 1.8 10E3/UL (ref 0.8–5.3)
LYMPHOCYTES NFR BLD AUTO: 19 %
MAGNESIUM SERPL-MCNC: 1.6 MG/DL (ref 1.7–2.3)
MCH RBC QN AUTO: 31.2 PG (ref 26.5–33)
MCHC RBC AUTO-ENTMCNC: 33.5 G/DL (ref 31.5–36.5)
MCV RBC AUTO: 93 FL (ref 78–100)
MONOCYTES # BLD AUTO: 0.7 10E3/UL (ref 0–1.3)
MONOCYTES NFR BLD AUTO: 7 %
NEUTROPHILS # BLD AUTO: 6.9 10E3/UL (ref 1.6–8.3)
NEUTROPHILS NFR BLD AUTO: 72 %
NRBC # BLD AUTO: 0 10E3/UL
NRBC BLD AUTO-RTO: 0 /100
O2/TOTAL GAS SETTING VFR VENT: 21 %
PCO2 BLD: 40 MM HG (ref 35–45)
PH BLD: 7.45 [PH] (ref 7.35–7.45)
PLATELET # BLD AUTO: 172 10E3/UL (ref 150–450)
PO2 BLD: 77 MM HG (ref 80–105)
POTASSIUM SERPL-SCNC: 4.3 MMOL/L (ref 3.4–5.3)
PTH-INTACT SERPL-MCNC: 32 PG/ML (ref 15–65)
RBC # BLD AUTO: 4.1 10E6/UL (ref 3.8–5.2)
SAO2 % BLDA: 94 % (ref 92–100)
SODIUM SERPL-SCNC: 137 MMOL/L (ref 135–145)
VIT D+METAB SERPL-MCNC: 43 NG/ML (ref 20–50)
WBC # BLD AUTO: 9.6 10E3/UL (ref 4–11)

## 2024-02-17 PROCEDURE — 250N000013 HC RX MED GY IP 250 OP 250 PS 637: Performed by: EMERGENCY MEDICINE

## 2024-02-17 PROCEDURE — 250N000009 HC RX 250: Performed by: EMERGENCY MEDICINE

## 2024-02-17 PROCEDURE — 250N000011 HC RX IP 250 OP 636: Performed by: EMERGENCY MEDICINE

## 2024-02-17 PROCEDURE — 82805 BLOOD GASES W/O2 SATURATION: CPT | Performed by: FAMILY MEDICINE

## 2024-02-17 PROCEDURE — 94150 VITAL CAPACITY TEST: CPT

## 2024-02-17 PROCEDURE — 999N000157 HC STATISTIC RCP TIME EA 10 MIN

## 2024-02-17 PROCEDURE — 99285 EMERGENCY DEPT VISIT HI MDM: CPT | Mod: 25 | Performed by: EMERGENCY MEDICINE

## 2024-02-17 PROCEDURE — 36415 COLL VENOUS BLD VENIPUNCTURE: CPT | Performed by: FAMILY MEDICINE

## 2024-02-17 PROCEDURE — 83735 ASSAY OF MAGNESIUM: CPT | Performed by: FAMILY MEDICINE

## 2024-02-17 PROCEDURE — 85004 AUTOMATED DIFF WBC COUNT: CPT | Performed by: EMERGENCY MEDICINE

## 2024-02-17 PROCEDURE — 200N000001 HC R&B ICU

## 2024-02-17 PROCEDURE — 72125 CT NECK SPINE W/O DYE: CPT | Mod: ME

## 2024-02-17 PROCEDURE — 258N000003 HC RX IP 258 OP 636: Performed by: FAMILY MEDICINE

## 2024-02-17 PROCEDURE — 71260 CT THORAX DX C+: CPT | Mod: MG

## 2024-02-17 PROCEDURE — 83970 ASSAY OF PARATHORMONE: CPT | Performed by: FAMILY MEDICINE

## 2024-02-17 PROCEDURE — 70450 CT HEAD/BRAIN W/O DYE: CPT | Mod: ME

## 2024-02-17 PROCEDURE — 99285 EMERGENCY DEPT VISIT HI MDM: CPT | Performed by: EMERGENCY MEDICINE

## 2024-02-17 PROCEDURE — 250N000012 HC RX MED GY IP 250 OP 636 PS 637: Performed by: FAMILY MEDICINE

## 2024-02-17 PROCEDURE — 74177 CT ABD & PELVIS W/CONTRAST: CPT | Mod: MA

## 2024-02-17 PROCEDURE — 83036 HEMOGLOBIN GLYCOSYLATED A1C: CPT | Performed by: FAMILY MEDICINE

## 2024-02-17 PROCEDURE — 36415 COLL VENOUS BLD VENIPUNCTURE: CPT | Performed by: EMERGENCY MEDICINE

## 2024-02-17 PROCEDURE — 80048 BASIC METABOLIC PNL TOTAL CA: CPT | Performed by: EMERGENCY MEDICINE

## 2024-02-17 PROCEDURE — 82962 GLUCOSE BLOOD TEST: CPT

## 2024-02-17 PROCEDURE — 99222 1ST HOSP IP/OBS MODERATE 55: CPT | Performed by: SURGERY

## 2024-02-17 PROCEDURE — 36600 WITHDRAWAL OF ARTERIAL BLOOD: CPT

## 2024-02-17 PROCEDURE — 82306 VITAMIN D 25 HYDROXY: CPT | Performed by: FAMILY MEDICINE

## 2024-02-17 PROCEDURE — 99223 1ST HOSP IP/OBS HIGH 75: CPT | Mod: AI | Performed by: FAMILY MEDICINE

## 2024-02-17 PROCEDURE — 85018 HEMOGLOBIN: CPT | Performed by: FAMILY MEDICINE

## 2024-02-17 PROCEDURE — 250N000013 HC RX MED GY IP 250 OP 250 PS 637: Performed by: FAMILY MEDICINE

## 2024-02-17 RX ORDER — ACETAMINOPHEN 500 MG
1000 TABLET ORAL ONCE
Status: COMPLETED | OUTPATIENT
Start: 2024-02-17 | End: 2024-02-17

## 2024-02-17 RX ORDER — BUPROPION HYDROCHLORIDE 150 MG/1
150 TABLET, EXTENDED RELEASE ORAL 2 TIMES DAILY
Status: DISCONTINUED | OUTPATIENT
Start: 2024-02-17 | End: 2024-02-19 | Stop reason: HOSPADM

## 2024-02-17 RX ORDER — LEVOTHYROXINE SODIUM 100 UG/1
100 TABLET ORAL
Status: DISCONTINUED | OUTPATIENT
Start: 2024-02-17 | End: 2024-02-19 | Stop reason: HOSPADM

## 2024-02-17 RX ORDER — NALOXONE HYDROCHLORIDE 0.4 MG/ML
0.2 INJECTION, SOLUTION INTRAMUSCULAR; INTRAVENOUS; SUBCUTANEOUS
Status: DISCONTINUED | OUTPATIENT
Start: 2024-02-17 | End: 2024-02-19 | Stop reason: HOSPADM

## 2024-02-17 RX ORDER — KRILL/OM-3/DHA/EPA/PHOSPHO/AST 500MG-86MG
1 CAPSULE ORAL EVERY EVENING
COMMUNITY

## 2024-02-17 RX ORDER — ACETAMINOPHEN 325 MG/1
975 TABLET ORAL EVERY 8 HOURS
Status: DISCONTINUED | OUTPATIENT
Start: 2024-02-17 | End: 2024-02-19 | Stop reason: HOSPADM

## 2024-02-17 RX ORDER — ACETAMINOPHEN 325 MG/1
650 TABLET ORAL EVERY 4 HOURS PRN
Status: DISCONTINUED | OUTPATIENT
Start: 2024-02-17 | End: 2024-02-19 | Stop reason: HOSPADM

## 2024-02-17 RX ORDER — MEMANTINE HYDROCHLORIDE 10 MG/1
10 TABLET ORAL 2 TIMES DAILY
COMMUNITY
Start: 2024-02-07

## 2024-02-17 RX ORDER — MULTIVITAMIN/IRON/FOLIC ACID 18MG-0.4MG
1 TABLET ORAL DAILY
COMMUNITY

## 2024-02-17 RX ORDER — IOPAMIDOL 755 MG/ML
58 INJECTION, SOLUTION INTRAVASCULAR ONCE
Status: COMPLETED | OUTPATIENT
Start: 2024-02-17 | End: 2024-02-17

## 2024-02-17 RX ORDER — ASPIRIN 81 MG/1
81 TABLET ORAL EVERY EVENING
COMMUNITY
Start: 2022-08-09

## 2024-02-17 RX ORDER — BLOOD SUGAR DIAGNOSTIC
1 STRIP MISCELLANEOUS 4 TIMES DAILY
COMMUNITY
Start: 2022-09-01

## 2024-02-17 RX ORDER — NICOTINE POLACRILEX 4 MG
15-30 LOZENGE BUCCAL
Status: DISCONTINUED | OUTPATIENT
Start: 2024-02-17 | End: 2024-02-17

## 2024-02-17 RX ORDER — PROCHLORPERAZINE 25 MG/1
1 SUPPOSITORY RECTAL
COMMUNITY

## 2024-02-17 RX ORDER — HYDROMORPHONE HCL IN WATER/PF 6 MG/30 ML
0.2 PATIENT CONTROLLED ANALGESIA SYRINGE INTRAVENOUS
Status: DISCONTINUED | OUTPATIENT
Start: 2024-02-17 | End: 2024-02-19 | Stop reason: HOSPADM

## 2024-02-17 RX ORDER — METHOCARBAMOL 500 MG/1
500 TABLET, FILM COATED ORAL EVERY 6 HOURS PRN
Status: DISCONTINUED | OUTPATIENT
Start: 2024-02-17 | End: 2024-02-19 | Stop reason: HOSPADM

## 2024-02-17 RX ORDER — ONDANSETRON 4 MG/1
4 TABLET, ORALLY DISINTEGRATING ORAL EVERY 6 HOURS PRN
Status: DISCONTINUED | OUTPATIENT
Start: 2024-02-17 | End: 2024-02-19 | Stop reason: HOSPADM

## 2024-02-17 RX ORDER — AMOXICILLIN 250 MG
2 CAPSULE ORAL 2 TIMES DAILY PRN
Status: DISCONTINUED | OUTPATIENT
Start: 2024-02-17 | End: 2024-02-19 | Stop reason: HOSPADM

## 2024-02-17 RX ORDER — MEMANTINE HYDROCHLORIDE 10 MG/1
10 TABLET ORAL 2 TIMES DAILY
Status: DISCONTINUED | OUTPATIENT
Start: 2024-02-17 | End: 2024-02-19 | Stop reason: HOSPADM

## 2024-02-17 RX ORDER — LIDOCAINE 4 G/G
1 PATCH TOPICAL
Status: DISCONTINUED | OUTPATIENT
Start: 2024-02-17 | End: 2024-02-19 | Stop reason: HOSPADM

## 2024-02-17 RX ORDER — DEXTROSE MONOHYDRATE 25 G/50ML
25-50 INJECTION, SOLUTION INTRAVENOUS
Status: DISCONTINUED | OUTPATIENT
Start: 2024-02-17 | End: 2024-02-19 | Stop reason: HOSPADM

## 2024-02-17 RX ORDER — NALOXONE HYDROCHLORIDE 0.4 MG/ML
0.4 INJECTION, SOLUTION INTRAMUSCULAR; INTRAVENOUS; SUBCUTANEOUS
Status: DISCONTINUED | OUTPATIENT
Start: 2024-02-17 | End: 2024-02-19 | Stop reason: HOSPADM

## 2024-02-17 RX ORDER — UREA 10 %
500 LOTION (ML) TOPICAL DAILY
COMMUNITY

## 2024-02-17 RX ORDER — ONDANSETRON 2 MG/ML
4 INJECTION INTRAMUSCULAR; INTRAVENOUS EVERY 6 HOURS PRN
Status: DISCONTINUED | OUTPATIENT
Start: 2024-02-17 | End: 2024-02-19 | Stop reason: HOSPADM

## 2024-02-17 RX ORDER — SODIUM CHLORIDE, SODIUM LACTATE, POTASSIUM CHLORIDE, CALCIUM CHLORIDE 600; 310; 30; 20 MG/100ML; MG/100ML; MG/100ML; MG/100ML
INJECTION, SOLUTION INTRAVENOUS CONTINUOUS
Status: DISCONTINUED | OUTPATIENT
Start: 2024-02-17 | End: 2024-02-18

## 2024-02-17 RX ORDER — AMOXICILLIN 250 MG
1 CAPSULE ORAL 2 TIMES DAILY PRN
Status: DISCONTINUED | OUTPATIENT
Start: 2024-02-17 | End: 2024-02-19 | Stop reason: HOSPADM

## 2024-02-17 RX ORDER — LISINOPRIL 40 MG/1
40 TABLET ORAL EVERY EVENING
Status: DISCONTINUED | OUTPATIENT
Start: 2024-02-17 | End: 2024-02-19 | Stop reason: HOSPADM

## 2024-02-17 RX ORDER — PANTOPRAZOLE SODIUM 40 MG/1
40 TABLET, DELAYED RELEASE ORAL
Status: DISCONTINUED | OUTPATIENT
Start: 2024-02-17 | End: 2024-02-19 | Stop reason: HOSPADM

## 2024-02-17 RX ORDER — NICOTINE POLACRILEX 4 MG
15-30 LOZENGE BUCCAL
Status: DISCONTINUED | OUTPATIENT
Start: 2024-02-17 | End: 2024-02-19 | Stop reason: HOSPADM

## 2024-02-17 RX ORDER — CALCIUM CARBONATE 500 MG/1
1000 TABLET, CHEWABLE ORAL 4 TIMES DAILY PRN
Status: DISCONTINUED | OUTPATIENT
Start: 2024-02-17 | End: 2024-02-19 | Stop reason: HOSPADM

## 2024-02-17 RX ORDER — DEXTROSE MONOHYDRATE 25 G/50ML
25-50 INJECTION, SOLUTION INTRAVENOUS
Status: DISCONTINUED | OUTPATIENT
Start: 2024-02-17 | End: 2024-02-17

## 2024-02-17 RX ORDER — GABAPENTIN 100 MG/1
100 CAPSULE ORAL 3 TIMES DAILY
Status: DISCONTINUED | OUTPATIENT
Start: 2024-02-17 | End: 2024-02-19 | Stop reason: HOSPADM

## 2024-02-17 RX ORDER — IOPAMIDOL 755 MG/ML
64 INJECTION, SOLUTION INTRAVASCULAR ONCE
Status: COMPLETED | OUTPATIENT
Start: 2024-02-17 | End: 2024-02-17

## 2024-02-17 RX ORDER — ATORVASTATIN CALCIUM 20 MG/1
20 TABLET, FILM COATED ORAL DAILY
Status: DISCONTINUED | OUTPATIENT
Start: 2024-02-17 | End: 2024-02-19 | Stop reason: HOSPADM

## 2024-02-17 RX ADMIN — SODIUM CHLORIDE 55 ML: 9 INJECTION, SOLUTION INTRAVENOUS at 04:51

## 2024-02-17 RX ADMIN — SODIUM CHLORIDE, POTASSIUM CHLORIDE, SODIUM LACTATE AND CALCIUM CHLORIDE 1000 ML: 600; 310; 30; 20 INJECTION, SOLUTION INTRAVENOUS at 09:35

## 2024-02-17 RX ADMIN — GABAPENTIN 100 MG: 100 CAPSULE ORAL at 13:16

## 2024-02-17 RX ADMIN — BUPROPION HYDROCHLORIDE 150 MG: 150 TABLET, EXTENDED RELEASE ORAL at 20:33

## 2024-02-17 RX ADMIN — ACETAMINOPHEN 975 MG: 325 TABLET, FILM COATED ORAL at 13:15

## 2024-02-17 RX ADMIN — PANTOPRAZOLE SODIUM 40 MG: 40 TABLET, DELAYED RELEASE ORAL at 17:05

## 2024-02-17 RX ADMIN — MEMANTINE 10 MG: 10 TABLET ORAL at 20:34

## 2024-02-17 RX ADMIN — ACETAMINOPHEN 1000 MG: 500 TABLET, FILM COATED ORAL at 04:27

## 2024-02-17 RX ADMIN — LEVOTHYROXINE SODIUM 100 MCG: 100 TABLET ORAL at 13:25

## 2024-02-17 RX ADMIN — METHOCARBAMOL 500 MG: 500 TABLET ORAL at 13:25

## 2024-02-17 RX ADMIN — IOPAMIDOL 58 ML: 755 INJECTION, SOLUTION INTRAVENOUS at 04:51

## 2024-02-17 RX ADMIN — INSULIN HUMAN 4 UNITS: 100 INJECTION, SUSPENSION SUBCUTANEOUS at 10:34

## 2024-02-17 RX ADMIN — LISINOPRIL 40 MG: 40 TABLET ORAL at 17:05

## 2024-02-17 RX ADMIN — SODIUM CHLORIDE, POTASSIUM CHLORIDE, SODIUM LACTATE AND CALCIUM CHLORIDE: 600; 310; 30; 20 INJECTION, SOLUTION INTRAVENOUS at 13:34

## 2024-02-17 RX ADMIN — GABAPENTIN 100 MG: 100 CAPSULE ORAL at 20:33

## 2024-02-17 RX ADMIN — LIDOCAINE 1 PATCH: 4 PATCH TOPICAL at 13:25

## 2024-02-17 RX ADMIN — ACETAMINOPHEN 975 MG: 325 TABLET, FILM COATED ORAL at 20:33

## 2024-02-17 RX ADMIN — ATORVASTATIN CALCIUM 20 MG: 20 TABLET, FILM COATED ORAL at 13:15

## 2024-02-17 RX ADMIN — SODIUM CHLORIDE 57 ML: 9 INJECTION, SOLUTION INTRAVENOUS at 08:29

## 2024-02-17 RX ADMIN — IOPAMIDOL 64 ML: 755 INJECTION, SOLUTION INTRAVENOUS at 08:29

## 2024-02-17 ASSESSMENT — ENCOUNTER SYMPTOMS
APPETITE CHANGE: 0
BACK PAIN: 1
ABDOMINAL PAIN: 0
HEADACHES: 0
FEVER: 0
CHEST TIGHTNESS: 0
LIGHT-HEADEDNESS: 0
COUGH: 0
SHORTNESS OF BREATH: 0
FATIGUE: 0
NECK PAIN: 0
NECK STIFFNESS: 0
CHILLS: 0

## 2024-02-17 ASSESSMENT — ACTIVITIES OF DAILY LIVING (ADL)
ADLS_ACUITY_SCORE: 38
ADLS_ACUITY_SCORE: 38
ADLS_ACUITY_SCORE: 31
ADLS_ACUITY_SCORE: 38
ADLS_ACUITY_SCORE: 31
ADLS_ACUITY_SCORE: 31
ADLS_ACUITY_SCORE: 29
ADLS_ACUITY_SCORE: 38
ADLS_ACUITY_SCORE: 43
ADLS_ACUITY_SCORE: 38

## 2024-02-17 NOTE — PROGRESS NOTES
"[facility] ADMISSION NOTE    Patient admitted to room 1002 at approximately 1230 via cart from emergency room. Patient was accompanied by nurse.     Verbal SBAR report received from DELLA Bailey prior to patient arrival.     Patient ambulated to bed with stand-by assist. Patient alert and oriented X 3. Pain is controlled with current analgesics.  Medication(s) being used: acetaminophen.  . Admission vital signs: Blood pressure 132/71, pulse 64, temperature 98.2  F (36.8  C), temperature source Oral, resp. rate 20, height 1.549 m (5' 1\"), weight 56.3 kg (124 lb 1.9 oz), SpO2 96%, not currently breastfeeding. Patient was oriented to plan of care, call light, bed controls, tv, telephone, bathroom, and visiting hours.     Risk Assessment    The following safety risks were identified during admission: fall. Yellow risk band applied: YES.     Skin Initial Assessment    This writer admitted this patient and completed a full skin assessment and Demetrio score in the Adult PCS flowsheet. Appropriate interventions initiated as needed.     Secondary skin check completed by Adrien HECTOR RN.    Demetrio Risk Assessment  Sensory Perception: 4-->no impairment  Moisture: 4-->rarely moist  Activity: 3-->walks occasionally  Mobility: 3-->slightly limited  Nutrition: 3-->adequate  Friction and Shear: 3-->no apparent problem  Demetrio Score: 20  Mattress: Low air loss (KRYSTYNA pump, Isolibrium, Skin Guard, Pulsate, Isoair, etc.)  Bed Frame: Standard width and length    Education    Patient has a [place] to Observation order: No  Observation education completed and documented: N/A    Pt has a purple bruise on left back. No open areas.      Mirian Hopper RN      "

## 2024-02-17 NOTE — PROGRESS NOTES
End Of Shift Note    Situation: S/P fall. Multi rib fx's 9-12 on left side with left side/posterior hematoma, right forehead ecchymosis.    Plan: Pain control, monitor resp status, Monitor HGB level     Subjective/Objective:    Neuro: A & O x 4, forgetful    Cardiac: SR, BP wnl, afebrile    Resp: LS diminished LLL, on roomair- sat 93-95%, RR WNL    GI/: Diabetic diet, drinking H20, voiding in bathroom    Endo: Pt has DM2, monitoring BG's and using sliding scale insulin    MSK: Asst 1 w/walker, (uses walker at baseline) Pt steady    Skin: Echymosis right upper forehead, large purple bruise left lower back/ribcage    LDAs: 1 PIV infusing LR at 75/hr.

## 2024-02-17 NOTE — CONSULTS
TRAUMA HISTORY AND PHYSICAL    Name:  Marcia Lawrence  Date/Time of Admission: 2024  3:57 AM   CSN: [id number]  Attending Provider: No att. providers found   Room/Bed:  ED09/ED09  : 1945  79 year old      Subjective:     TRAUMATIC EVENT:  Fall    Mode of Arrival: Ambulance     [] Trauma l      [] Trauma ll   [x] Trauma Consult   []  Trauma transfer from:      HPI:  Marcia Lawrence is a 79 year old female who arrived to [facility] Emergency Department  following fall.  Patient reports she was walking without her walker and stumbled falling onto her left side on a small drawer. Denies loss of conscious; but did hit the right side of her head with a noted ecchymosis. Had immediate pain on her right posterior thorax area. Was able get up and back to bed but had continued pain so came in for evaluation. Patient currently denies head or neck pain. Reports isolated pain in her posterior chest. Denies numbness, tingling, or weakness. No SOB; no anterior chest pain.  Denies pain elsewhere in the body.  Denies any blood thinners or DOAC except for a daily baby ASA.  Patient does admit to some ongoing forgetfulness. Lives alone in an independent living facility.     ED workup noted a right frontal scalp nodule which is consistent with physical exam finding and ecchymosis.  Also multiple displaced posterior left ribs 9th-12th with left paraspinal hematoma.  This is consistent with ecchymosis and palpable hematoma at the back. There a small amount of fluid along the spleen likely reactive to the rib fractures but no evidence of splenic laceration or subcapsular hematoma.        The patient was seen at the request of the ER for trauma consultation.     Primary Care Physician:  Anna Champion     Allergies:    Allergies   Allergen Reactions    Percocet [Oxycodone-Acetaminophen] Other (See Comments)     Delirium     Cefuroxime Swelling    Pcn [Penicillins] Hives    Sulfa Antibiotics Rash     "    Outpatient Meds:  (Not in a hospital admission)      Past Medical History:  Past Medical History:   Diagnosis Date    Arthritis     osteoporosis    Depression     Diabetes (H)     Gastro-oesophageal reflux disease     HLD (hyperlipidaemia)     Hypertension     Osteoporosis     Other chronic pain     chronic low back pain    Sebaceous cyst     Senile osteoporosis     Thyroid disease     hypothyroidism        Past Surgical History:   Past Surgical History:   Procedure Laterality Date    GASTRIC BYPASS      hernia repair          Social History:  Social History     Socioeconomic History    Marital status:      Spouse name: Not on file    Number of children: Not on file    Years of education: Not on file    Highest education level: Not on file   Occupational History    Not on file   Tobacco Use    Smoking status: Never    Smokeless tobacco: Never   Substance and Sexual Activity    Alcohol use: No    Drug use: No    Sexual activity: Never   Other Topics Concern    Parent/sibling w/ CABG, MI or angioplasty before 65F 55M? Not Asked   Social History Narrative    Not on file     Social Determinants of Health     Financial Resource Strain: Not on file   Food Insecurity: Not on file   Transportation Needs: Not on file   Physical Activity: Not on file   Stress: Not on file   Social Connections: Not on file   Interpersonal Safety: Not on file   Housing Stability: Not on file       Family History:  Family History   Problem Relation Age of Onset    Diabetes Maternal Grandmother     Diabetes Maternal Grandfather        Review of Systems:  10 point review of systems reviewed and negative unless noted above in the HPI    Objective:     Vital Signs:  /71   Pulse 78   Temp 98.1  F (36.7  C) (Oral)   Resp 20   Ht 1.549 m (5' 1\")   Wt 54.4 kg (120 lb)   SpO2 96%   BMI 22.67 kg/m      Primary Survey: n/a       Secondary Survey:     General Appearance AOx3, in no acute distress   Head/CSPINE Head atraumatic, CSPINE " without tenderness or bony step-off.  Noted old ecchymosis along the right frontal area with a nodular bump at the scalp.   Ears, Nose, Throat ENT exam normal, conjunctivae/corneas clear. EOM's intact. No battles sign or raccoon eyes present.   Neck Supple without obvious injury. No notable JVD   Chest Resp: Posterior lower left ribs/chest wall ecchymosis and palpable hematoma with no skin breakdown and noted tenderness on palpation, respiratory effort normal, no use of accessory muscles.  CV: RRR, no murmurs, gallops or rubs   Gastrointestinal Abdomen soft, nontender, nondistended. No rebound, guarding, or rigidity present. No palpable masses. No CVA tenderness. No gross deformities      LSPINE No obvious tenderness or bony step off deformities.   Extremities FROM.     and Rectal:    Skin Normal coloration and turgor, no rashes, no suspicious skin lesions noted  Noted various age spots throughout abdomen and back   Pelvis Stable with no gross deformities. Without obvious tenderness to palpation         Labs and Radioogy:     Results for orders placed or performed during the hospital encounter of 02/17/24 (from the past 24 hour(s))   CBC with platelets differential    Narrative    The following orders were created for panel order CBC with platelets differential.  Procedure                               Abnormality         Status                     ---------                               -----------         ------                     CBC with platelets and d...[[id number]]                      Final result                 Please view results for these tests on the individual orders.   Basic metabolic panel   Result Value Ref Range    Sodium 137 135 - 145 mmol/L    Potassium 4.3 3.4 - 5.3 mmol/L    Chloride 101 98 - 107 mmol/L    Carbon Dioxide (CO2) 25 22 - 29 mmol/L    Anion Gap 11 7 - 15 mmol/L    Urea Nitrogen 19.8 8.0 - 23.0 mg/dL    Creatinine 0.77 0.51 - 0.95 mg/dL    GFR Estimate 77 >60 mL/min/1.73m2     Calcium 9.3 8.8 - 10.2 mg/dL    Glucose 191 (H) 70 - 99 mg/dL   CBC with platelets and differential   Result Value Ref Range    WBC Count 9.6 4.0 - 11.0 10e3/uL    RBC Count 4.10 3.80 - 5.20 10e6/uL    Hemoglobin 12.8 11.7 - 15.7 g/dL    Hematocrit 38.2 35.0 - 47.0 %    MCV 93 78 - 100 fL    MCH 31.2 26.5 - 33.0 pg    MCHC 33.5 31.5 - 36.5 g/dL    RDW 12.7 10.0 - 15.0 %    Platelet Count 172 150 - 450 10e3/uL    % Neutrophils 72 %    % Lymphocytes 19 %    % Monocytes 7 %    % Eosinophils 2 %    % Basophils 0 %    % Immature Granulocytes 0 %    NRBCs per 100 WBC 0 <1 /100    Absolute Neutrophils 6.9 1.6 - 8.3 10e3/uL    Absolute Lymphocytes 1.8 0.8 - 5.3 10e3/uL    Absolute Monocytes 0.7 0.0 - 1.3 10e3/uL    Absolute Eosinophils 0.1 0.0 - 0.7 10e3/uL    Absolute Basophils 0.0 0.0 - 0.2 10e3/uL    Absolute Immature Granulocytes 0.0 <=0.4 10e3/uL    Absolute NRBCs 0.0 10e3/uL   Head CT w/o contrast    Narrative    EXAM: CT HEAD W/O CONTRAST  LOCATION: [facility]  DATE: 2/17/2024    INDICATION: head injury, fall  COMPARISON: 11/30/2021  TECHNIQUE: Routine CT Head without IV contrast. Multiplanar reformats. Dose reduction techniques were used.    FINDINGS:  INTRACRANIAL CONTENTS: No intracranial hemorrhage, extraaxial collection, or mass effect.  No CT evidence of acute infarct. Mild to moderate presumed chronic small vessel ischemic changes. Mild to moderate generalized volume loss. No hydrocephalus.     VISUALIZED ORBITS/SINUSES/MASTOIDS: Prior bilateral cataract surgery. Visualized portions of the orbits are otherwise unremarkable. No paranasal sinus mucosal disease. No middle ear or mastoid effusion.    BONES/SOFT TISSUES: No acute abnormality. Subcutaneous nodule in the right frontal scalp measuring approximately 9 mm in diameter (series 2 image 20).      Impression    IMPRESSION:  1.  No CT evidence for acute intracranial process.  2.  Brain atrophy and presumed chronic microvascular  ischemic changes as above.  3.  Subcutaneous nodule in the right frontal scalp measuring approximately 9 mm in diameter, new from the prior exam. Correlate with direct visualization.   CT Cervical Spine w/o Contrast    Narrative    EXAM: CT CERVICAL SPINE W/O CONTRAST  LOCATION: [facility]  DATE: 2/17/2024    INDICATION: Traumatic injury.  COMPARISON: None.  TECHNIQUE: Routine CT Cervical Spine without IV contrast. Multiplanar reformats. Dose reduction techniques were used.    FINDINGS:  VERTEBRA: Straightening of usual cervical lordosis. Vertebral body height is normal. Slight anterolisthesis of C3 No fracture or posttraumatic subluxation.     CANAL/FORAMINA: No high-grade central canal or neural foraminal stenosis.    PARASPINAL: No extraspinal abnormality.      Impression    IMPRESSION:  1.  No fracture or posttraumatic subluxation.  2.  No high-grade spinal canal or neural foraminal stenosis.  3.  Straightening of the usual cervical lordosis.     CT Chest w Contrast    Narrative    EXAM: CT CHEST WITH CONTRAST  LOCATION: [facility]  DATE: 02/17/2024    INDICATION: Left chest pain after a fall, possible rib fractures.  COMPARISON: None.  TECHNIQUE: CT chest with IV contrast. Multiplanar reformats were obtained. Dose reduction techniques were used.    CONTRAST: 58 mL Isovue 370.    FINDINGS:   LUNGS AND PLEURA: No pneumothorax. Minimal posterior left hemothorax. No definitive evidence for active contrast extravasation into the high-density blood products within the left pleural space. Mild atelectasis left lung base.    MEDIASTINUM/AXILLAE: Minimal sliding esophageal hiatal hernia. No pericardial fluid. Normal caliber esophagus without dissection. Central pulmonary arteries clear.    CORONARY ARTERY CALCIFICATION: Severe.    UPPER ABDOMEN: No evidence for active contrast extravasation or active bleeding from the spleen. There is a small amount of fluid  immediately adjacent to the spleen. No evidence for perinephric fluid. Large calculi within the left intrarenal collecting   system with a 1.3 cm ovoid calculus. Cholecystectomy.    MUSCULOSKELETAL: Acute displaced posterior left ninth -twelfth rib fractures with marked displacement of the ninth and tenth rib fractures of. Mild displacement of the tenth, eleventh and twelfth rib fractures of. The twelfth rib fracture has two   separate sites of fracture, one at the left rib head and neck region and one more posteriorly. Small hematoma in the left paraspinal musculature adjacent. Multiple old healed left anterior and lateral lower rib fractures. No right-sided rib fractures.   Right shoulder arthroplasty. Degenerative hypertrophic changes throughout the spine. Vertebroplasty at the thoracolumbar junction involving T12, L1 and L2. Chronic partially sclerotic oblique fracture involving the anteroinferior aspect of the T7   vertebral body without evidence for significant vertebral body loss of height.      Impression    IMPRESSION:   1.  Markedly displaced posterior left rib fractures involving the left posterior ninth and twelfth ribs with two separate fractures in the posterior left twelfth rib. The left ninth and tenth ribs are markedly displaced. These fractures are surrounded by   a left paraspinal hematoma which is somewhat difficult to to delineate.    2.  Minimal high density pleural fluid in the left pleural space compatible with hemothorax. No active contrast extravasation or bleeding.    3.  Minimal amount of fluid along the medial aspect of the spleen without evidence for active contrast extravasation or active bleeding to the spleen.    4.  No pneumothorax. Mild atelectasis left lung base.     CT Abdomen Pelvis w Contrast    Narrative    EXAM: CT ABDOMEN PELVIS W CONTRAST  LOCATION: [facility]  DATE: 2/17/2024    INDICATION: blunt trauma w  multiple rib fractures and hemothorax,  fluid around spleen on chest CT  COMPARISON: None.  TECHNIQUE: CT scan of the abdomen and pelvis was performed following injection of IV contrast. Multiplanar reformats were obtained. Dose reduction techniques were used.  CONTRAST: Iodinated IV contrast    FINDINGS:     LOWER CHEST: Please see separately dictated CT chest dated earlier same day.     HEPATOBILIARY: Normal liver parenchyma. Reservoir effect biliary dilation. Cholecystectomy.     PANCREAS: Normal.     SPLEEN: Minimal perisplenic fluid. No appreciable splenic laceration or subcapsular hematoma. Spleen enhances normally.     ADRENAL GLANDS: Normal.     KIDNEYS/BLADDER: Normal kidneys. No hydronephrosis. Contrast is seen within both renal collecting systems and the urinary bladder. Normal wall thickness and contour of the urinary bladder.     BOWEL: Prior gastric surgery. No obstruction. Appendix is not visualized. No secondary findings of acute appendicitis in the right lower quadrant. No bowel wall thickening or pneumatosis. No pneumoperitoneum.    LYMPH NODES: No pathologically enlarged lymph nodes.    VASCULATURE: Nonaneurysmal aorta with moderate aortoiliac calcifications.     PELVIC ORGANS: No pelvic masses.     MUSCULOSKELETAL: Bones are diffusely demineralized. Multilevel thoracolumbar compression deformities, status post T12-L3 vertebroplasties. Left-sided rib fractures, better characterized on same day CT chest. Left chest wall extrapleural hematoma. Two   rounded soft tissue densities within the anterior abdominal wall near the umbilicus, with associated dystrophic calcifications, favored to represent injection granulomas. Soft tissue stranding overlying the left hip.       Impression    IMPRESSION:  1.  Minimal free fluid abutting the spleen, likely reactive given left-sided rib fractures. No splenic laceration or subcapsular hematoma.  2.  No other traumatic findings in the abdomen or pelvis.       Interventions:   Intubation:   Central  Access:   Laceration repair:  Chest tube (s):    Thoracotomy:   DPL :   Other:      Consults:     Ortho:    ENT:     Neurosurgery:    Plastics:    Opthal:    OB/Gyn:    Urology:    Other:       Injury Assessment:     79 year old female who presents with:    1. fall  2. Rib fractures - displaced left 9th-12th  3. Left paraspinal hematoma  4. Left hemothorax    Plan:     Discussed care with hospitalist  Plan for Hgb later today and tomorrow in AM to confirm stability  Skin check especially at back where hematoma to make sure no skin breakdown  Continue medical optimization  Rib fractures order set  Stable for trauma standpoint  Will follow peripherally    Face to Face/patient Contact total time: 15 minutes  Pre Charting time: 15 minutes; Post charting time, communication and other activities: 15 minutes;   Total time:  45 minutes      Electronically signed by:  Michelle Taylor MD  2/17/2024

## 2024-02-17 NOTE — ED PROVIDER NOTES
History     Chief Complaint   Patient presents with    Back Pain     HPI  Marcia Lawrence is a 79 year old female with no significant concerning past medical history admitted for evaluation of a fall tonight.  Patient reports she was walking and stumbled falling onto her left side on a small door.  Denies striking her head or loss of conscious.  Had immediate pain on her right posterior thorax area.  Was able get her set up and back to bed but had continued pain so came in for evaluation.  Patient currently denies head or neck pain.  Reports isolated pain in her posterior chest.  Denies numbness, tingling, or weakness.  Reports she was feeling well previously.  Patient does admit to some ongoing forgetfulness.  Lives alone in an independent living facility.    Allergies:  Allergies   Allergen Reactions    Percocet [Oxycodone-Acetaminophen] Other (See Comments)     Delirium     Aspirin Other (See Comments)     Gastric bypass    Cefuroxime Swelling    Pcn [Penicillins] Hives    Sulfa Antibiotics Rash       Problem List:    Patient Active Problem List    Diagnosis Date Noted    Acute midline low back pain with sciatica, sciatica laterality unspecified 09/30/2016     Priority: Medium    Essential hypertension with goal blood pressure less than 140/90 09/09/2016     Priority: Medium    Adjustment disorder with depressed mood 03/15/2016     Priority: Medium    Type 2 diabetes mellitus with complication (H) 10/24/2015     Priority: Medium    Intestinal malabsorption 10/17/2014     Priority: Medium     Problem list name updated by automated process. Provider to review      Lumbar compression fracture (H) 10/17/2014     Priority: Medium     9/29 mri       Age-related osteoporosis with current pathological fracture 10/17/2014     Priority: Medium     Complicated by bariatric surgery h.o         Bariatric surgery status (GASTRIC BYPASS) 08/18/2014     Priority: Medium     2008        Hyperlipidemia LDL goal <100 08/18/2014      Priority: Medium    Health Care Home 08/14/2014     Priority: Medium     State Tier Level:    Status:  Declined  Care Coordinator:  Mallorie Roman    See Letters for [facility] Care Plan  Date:  December 3, 2015            Advanced directives, counseling/discussion 07/29/2014     Priority: Medium    Adjustment disorder with mixed anxiety and depressed mood 07/29/2014     Priority: Medium    Compression fracture of L1 lumbar vertebra (H) 07/28/2014     Priority: Medium    Esophageal reflux 11/09/2012     Priority: Medium    PROXIMAL LEFT HUMERUS FRACTURE 12/03/2007     Priority: Medium        Past Medical History:    Past Medical History:   Diagnosis Date    Arthritis     Depression     Diabetes (H)     Gastro-oesophageal reflux disease     HLD (hyperlipidaemia)     Hypertension     Osteoporosis     Other chronic pain     Sebaceous cyst     Senile osteoporosis     Thyroid disease        Past Surgical History:    Past Surgical History:   Procedure Laterality Date    GASTRIC BYPASS      hernia repair         Family History:    Family History   Problem Relation Age of Onset    Diabetes Maternal Grandmother     Diabetes Maternal Grandfather        Social History:  Marital Status:   [5]  Social History     Tobacco Use    Smoking status: Never    Smokeless tobacco: Never   Substance Use Topics    Alcohol use: No    Drug use: No        Medications:    acetaminophen (TYLENOL) 325 MG tablet  atorvastatin (LIPITOR) 40 MG tablet  buPROPion (WELLBUTRIN SR) 150 MG 12 hr tablet  CALCIUM CITRATE PO  Cholecalciferol (VITAMIN D3 PO)  Continuous Blood Gluc  (FREESTYLE SARA 2 READER) PETE  insulin NPH (HUMULIN N VIAL) 100 UNIT/ML susp  insulin regular (NOVOLIN R VIAL) 100 UNIT/ML VIAL  levothyroxine (SYNTHROID/LEVOTHROID) 100 MCG tablet  lisinopril (PRINIVIL,ZESTRIL) 40 MG tablet  multivitamin  peds with iron (FLINTSTONES COMPLETE) 60 MG chewable tablet  omeprazole (PRILOSEC) 20 MG capsule  order for DME  traZODone (DESYREL)  "50 MG tablet  vitamin B-12 (CYANOCOBALAMIN) 1000 MCG tablet          Review of Systems   Constitutional:  Negative for appetite change, chills, fatigue and fever.   HENT:  Negative for congestion.    Respiratory:  Negative for cough, chest tightness and shortness of breath.    Cardiovascular:  Negative for chest pain.   Gastrointestinal:  Negative for abdominal pain.   Genitourinary:  Negative for decreased urine volume.   Musculoskeletal:  Positive for back pain. Negative for neck pain and neck stiffness.   Neurological:  Negative for light-headedness and headaches.   All other systems reviewed and are negative.      Physical Exam   BP: (!) 184/81  Pulse: 78  Temp: 98.1  F (36.7  C)  Resp: 20  Height: 154.9 cm (5' 1\")  Weight: 54.4 kg (120 lb)  SpO2: 97 %      Physical Exam  Vitals and nursing note reviewed.   Constitutional:       Appearance: Normal appearance. She is not ill-appearing or diaphoretic.   HENT:      Head:        Nose: Nose normal.      Mouth/Throat:      Mouth: Mucous membranes are moist.   Eyes:      Conjunctiva/sclera: Conjunctivae normal.   Cardiovascular:      Rate and Rhythm: Normal rate.      Pulses: Normal pulses.   Pulmonary:      Effort: Pulmonary effort is normal. No respiratory distress or retractions.      Breath sounds: Normal breath sounds.       Chest:      Chest wall: Tenderness present.   Abdominal:      Tenderness: There is no abdominal tenderness.   Musculoskeletal:         General: Normal range of motion.   Skin:     General: Skin is warm and dry.      Capillary Refill: Capillary refill takes less than 2 seconds.   Neurological:      Mental Status: She is alert and oriented to person, place, and time.   Psychiatric:         Mood and Affect: Mood normal.         ED Course                 Procedures                  Results for orders placed or performed during the hospital encounter of 02/17/24 (from the past 24 hour(s))   CBC with platelets differential    Narrative    The " following orders were created for panel order CBC with platelets differential.  Procedure                               Abnormality         Status                     ---------                               -----------         ------                     CBC with platelets and d...[[id number]]                      Final result                 Please view results for these tests on the individual orders.   Basic metabolic panel   Result Value Ref Range    Sodium 137 135 - 145 mmol/L    Potassium 4.3 3.4 - 5.3 mmol/L    Chloride 101 98 - 107 mmol/L    Carbon Dioxide (CO2) 25 22 - 29 mmol/L    Anion Gap 11 7 - 15 mmol/L    Urea Nitrogen 19.8 8.0 - 23.0 mg/dL    Creatinine 0.77 0.51 - 0.95 mg/dL    GFR Estimate 77 >60 mL/min/1.73m2    Calcium 9.3 8.8 - 10.2 mg/dL    Glucose 191 (H) 70 - 99 mg/dL   CBC with platelets and differential   Result Value Ref Range    WBC Count 9.6 4.0 - 11.0 10e3/uL    RBC Count 4.10 3.80 - 5.20 10e6/uL    Hemoglobin 12.8 11.7 - 15.7 g/dL    Hematocrit 38.2 35.0 - 47.0 %    MCV 93 78 - 100 fL    MCH 31.2 26.5 - 33.0 pg    MCHC 33.5 31.5 - 36.5 g/dL    RDW 12.7 10.0 - 15.0 %    Platelet Count 172 150 - 450 10e3/uL    % Neutrophils 72 %    % Lymphocytes 19 %    % Monocytes 7 %    % Eosinophils 2 %    % Basophils 0 %    % Immature Granulocytes 0 %    NRBCs per 100 WBC 0 <1 /100    Absolute Neutrophils 6.9 1.6 - 8.3 10e3/uL    Absolute Lymphocytes 1.8 0.8 - 5.3 10e3/uL    Absolute Monocytes 0.7 0.0 - 1.3 10e3/uL    Absolute Eosinophils 0.1 0.0 - 0.7 10e3/uL    Absolute Basophils 0.0 0.0 - 0.2 10e3/uL    Absolute Immature Granulocytes 0.0 <=0.4 10e3/uL    Absolute NRBCs 0.0 10e3/uL   Head CT w/o contrast    Narrative    EXAM: CT HEAD W/O CONTRAST  LOCATION: [facility]  DATE: 2/17/2024    INDICATION: head injury, fall  COMPARISON: 11/30/2021  TECHNIQUE: Routine CT Head without IV contrast. Multiplanar reformats. Dose reduction techniques were  used.    FINDINGS:  INTRACRANIAL CONTENTS: No intracranial hemorrhage, extraaxial collection, or mass effect.  No CT evidence of acute infarct. Mild to moderate presumed chronic small vessel ischemic changes. Mild to moderate generalized volume loss. No hydrocephalus.     VISUALIZED ORBITS/SINUSES/MASTOIDS: Prior bilateral cataract surgery. Visualized portions of the orbits are otherwise unremarkable. No paranasal sinus mucosal disease. No middle ear or mastoid effusion.    BONES/SOFT TISSUES: No acute abnormality. Subcutaneous nodule in the right frontal scalp measuring approximately 9 mm in diameter (series 2 image 20).      Impression    IMPRESSION:  1.  No CT evidence for acute intracranial process.  2.  Brain atrophy and presumed chronic microvascular ischemic changes as above.  3.  Subcutaneous nodule in the right frontal scalp measuring approximately 9 mm in diameter, new from the prior exam. Correlate with direct visualization.   CT Cervical Spine w/o Contrast    Narrative    EXAM: CT CERVICAL SPINE W/O CONTRAST  LOCATION: [facility]  DATE: 2/17/2024    INDICATION: Traumatic injury.  COMPARISON: None.  TECHNIQUE: Routine CT Cervical Spine without IV contrast. Multiplanar reformats. Dose reduction techniques were used.    FINDINGS:  VERTEBRA: Straightening of usual cervical lordosis. Vertebral body height is normal. Slight anterolisthesis of C3 No fracture or posttraumatic subluxation.     CANAL/FORAMINA: No high-grade central canal or neural foraminal stenosis.    PARASPINAL: No extraspinal abnormality.      Impression    IMPRESSION:  1.  No fracture or posttraumatic subluxation.  2.  No high-grade spinal canal or neural foraminal stenosis.  3.  Straightening of the usual cervical lordosis.     CT Chest w Contrast    Narrative    EXAM: CT CHEST WITH CONTRAST  LOCATION: [facility]  DATE: 02/17/2024    INDICATION: Left chest pain after a fall, possible  rib fractures.  COMPARISON: None.  TECHNIQUE: CT chest with IV contrast. Multiplanar reformats were obtained. Dose reduction techniques were used.    CONTRAST: 58 mL Isovue 370.    FINDINGS:   LUNGS AND PLEURA: No pneumothorax. Minimal posterior left hemothorax. No definitive evidence for active contrast extravasation into the high-density blood products within the left pleural space. Mild atelectasis left lung base.    MEDIASTINUM/AXILLAE: Minimal sliding esophageal hiatal hernia. No pericardial fluid. Normal caliber esophagus without dissection. Central pulmonary arteries clear.    CORONARY ARTERY CALCIFICATION: Severe.    UPPER ABDOMEN: No evidence for active contrast extravasation or active bleeding from the spleen. There is a small amount of fluid immediately adjacent to the spleen. No evidence for perinephric fluid. Large calculi within the left intrarenal collecting   system with a 1.3 cm ovoid calculus. Cholecystectomy.    MUSCULOSKELETAL: Acute displaced posterior left ninth -twelfth rib fractures with marked displacement of the ninth and tenth rib fractures of. Mild displacement of the tenth, eleventh and twelfth rib fractures of. The twelfth rib fracture has two   separate sites of fracture, one at the left rib head and neck region and one more posteriorly. Small hematoma in the left paraspinal musculature adjacent. Multiple old healed left anterior and lateral lower rib fractures. No right-sided rib fractures.   Right shoulder arthroplasty. Degenerative hypertrophic changes throughout the spine. Vertebroplasty at the thoracolumbar junction involving T12, L1 and L2. Chronic partially sclerotic oblique fracture involving the anteroinferior aspect of the T7   vertebral body without evidence for significant vertebral body loss of height.      Impression    IMPRESSION:   1.  Markedly displaced posterior left rib fractures involving the left posterior ninth and twelfth ribs with two separate fractures in the  posterior left twelfth rib. The left ninth and tenth ribs are markedly displaced. These fractures are surrounded by   a left paraspinal hematoma which is somewhat difficult to to delineate.    2.  Minimal high density pleural fluid in the left pleural space compatible with hemothorax. No active contrast extravasation or bleeding.    3.  Minimal amount of fluid along the medial aspect of the spleen without evidence for active contrast extravasation or active bleeding to the spleen.    4.  No pneumothorax. Mild atelectasis left lung base.         Medications   acetaminophen (TYLENOL) tablet 1,000 mg (1,000 mg Oral $Given 2/17/24 3648)   iopamidol (ISOVUE-370) solution 58 mL (58 mLs Intravenous $Given 2/17/24 4925)   sodium chloride 0.9 % bag 500mL for CT scan flush use (55 mLs Intravenous $Given 2/17/24 0121)     5:42 AM Patient re-assessed: Resting comfortably in bed.  Denies significant pain at this time.  Patient advised of the finding of multiple rib fractures and the need for admission    5:54 AM: Discussed with Dr. Taylor, surgery. Reviewed history and CT findings of multiple rib fractures with a small fluid around spleen without active bleeding. Would be okay with mission here if medicine is comfortable managing.     6:33 AM: Patient was signed out at shift change to Dr Aj as hospitalist has not called back.     6:51 AM; Discussed with hospitalist for admission, Dr Lemus. Accepts for admission.       Assessments & Plan (with Medical Decision Making)  79-year-old female presenting for evaluation of a fall with injury to her left chest wall.  Patient reports she stumbled at home falling into her chest.  Has localized pain in this area with a localized hematoma.  CT imaging obtained showed evidence of multiple rib fractures in the area of injury with a small hemothorax.  No pneumothorax.  Also noted possible small splenic injury.  No evidence of head or cervical injury on imaging.  Patient pain is  well-controlled with Tylenol and rest.  Discussed case with surgery and hospitalist.     I have reviewed the nursing notes.    I have reviewed the findings, diagnosis, plan and need for follow up with the patient.        New Prescriptions    No medications on file       Final diagnoses:   Fall, initial encounter   Closed fracture of multiple ribs of left side, initial encounter       2/17/2024   [facility] EMERGENCY DEPT       Johnson, Jama Zuluaga MD  02/17/24 0634       Johnson, Jama Zuluaga MD  02/17/24 0652

## 2024-02-17 NOTE — ED NOTES
"[facility]   Admission Handoff    The patient is Marcia Lawrence, 79 year old who arrived in the ED by AMBULANCE from home with a complaint of Back Pain  . After fall landing on drawer The patient's current symptoms are new and during this time the symptoms have remained the same. In the ED, patient was diagnosed with   Final diagnoses:   Fall, initial encounter   Closed fracture of multiple ribs of left side, initial encounter         Needed?: No    Allergies:    Allergies   Allergen Reactions    Percocet [Oxycodone-Acetaminophen] Other (See Comments)     Delirium     Aspirin Other (See Comments)     Gastric bypass    Cefuroxime Swelling    Pcn [Penicillins] Hives    Sulfa Antibiotics Rash       Past Medical Hx:   Past Medical History:   Diagnosis Date    Arthritis     osteoporosis    Depression     Diabetes (H)     Gastro-oesophageal reflux disease     HLD (hyperlipidaemia)     Hypertension     Osteoporosis     Other chronic pain     chronic low back pain    Sebaceous cyst     Senile osteoporosis     Thyroid disease     hypothyroidism       Initial vitals were: BP: (!) 184/81  Pulse: 78  Temp: 98.1  F (36.7  C)  Resp: 20  Height: 154.9 cm (5' 1\")  Weight: 54.4 kg (120 lb)  SpO2: 97 %   Recent vital Signs: /71   Pulse 78   Temp 98.1  F (36.7  C) (Oral)   Resp 20   Ht 1.549 m (5' 1\")   Wt 54.4 kg (120 lb)   SpO2 96%   BMI 22.67 kg/m      Elimination Status: Continent: Yes     Activity Level: SBA    Fall Status: Reason for falls risk:  Mobility  nonskid shoes/slippers when out of bed    Baseline Mental status: WDL  Current Mental Status changes: at basesline    Infection present or suspected this encounter: no  Sepsis suspected: No    Isolation type: none    Bariatric equipment needed?: No    In the ED these meds were given:   Medications   acetaminophen (TYLENOL) tablet 1,000 mg (1,000 mg Oral $Given 2/17/24 4146)   iopamidol (ISOVUE-370) solution 58 mL (58 mLs " Intravenous $Given 2/17/24 0451)   sodium chloride 0.9 % bag 500mL for CT scan flush use (55 mLs Intravenous $Given 2/17/24 0451)       Drips running?  No    Home pump  No    Current LDAs: Peripheral IV: Site Right lower forearm ; Gauge 20  IV push only, no IV fluids     Results:   Labs/Imaging  Ordered and Resulted from Time of ED Arrival Up to the Time of Departure from the ED  Results for orders placed or performed during the hospital encounter of 02/17/24 (from the past 24 hour(s))   CBC with platelets differential    Narrative    The following orders were created for panel order CBC with platelets differential.  Procedure                               Abnormality         Status                     ---------                               -----------         ------                     CBC with platelets and d...[[id number]]                      Final result                 Please view results for these tests on the individual orders.   Basic metabolic panel   Result Value Ref Range    Sodium 137 135 - 145 mmol/L    Potassium 4.3 3.4 - 5.3 mmol/L    Chloride 101 98 - 107 mmol/L    Carbon Dioxide (CO2) 25 22 - 29 mmol/L    Anion Gap 11 7 - 15 mmol/L    Urea Nitrogen 19.8 8.0 - 23.0 mg/dL    Creatinine 0.77 0.51 - 0.95 mg/dL    GFR Estimate 77 >60 mL/min/1.73m2    Calcium 9.3 8.8 - 10.2 mg/dL    Glucose 191 (H) 70 - 99 mg/dL   CBC with platelets and differential   Result Value Ref Range    WBC Count 9.6 4.0 - 11.0 10e3/uL    RBC Count 4.10 3.80 - 5.20 10e6/uL    Hemoglobin 12.8 11.7 - 15.7 g/dL    Hematocrit 38.2 35.0 - 47.0 %    MCV 93 78 - 100 fL    MCH 31.2 26.5 - 33.0 pg    MCHC 33.5 31.5 - 36.5 g/dL    RDW 12.7 10.0 - 15.0 %    Platelet Count 172 150 - 450 10e3/uL    % Neutrophils 72 %    % Lymphocytes 19 %    % Monocytes 7 %    % Eosinophils 2 %    % Basophils 0 %    % Immature Granulocytes 0 %    NRBCs per 100 WBC 0 <1 /100    Absolute Neutrophils 6.9 1.6 - 8.3 10e3/uL    Absolute Lymphocytes 1.8 0.8 - 5.3  10e3/uL    Absolute Monocytes 0.7 0.0 - 1.3 10e3/uL    Absolute Eosinophils 0.1 0.0 - 0.7 10e3/uL    Absolute Basophils 0.0 0.0 - 0.2 10e3/uL    Absolute Immature Granulocytes 0.0 <=0.4 10e3/uL    Absolute NRBCs 0.0 10e3/uL   Head CT w/o contrast    Narrative    EXAM: CT HEAD W/O CONTRAST  LOCATION: [facility]  DATE: 2/17/2024    INDICATION: head injury, fall  COMPARISON: 11/30/2021  TECHNIQUE: Routine CT Head without IV contrast. Multiplanar reformats. Dose reduction techniques were used.    FINDINGS:  INTRACRANIAL CONTENTS: No intracranial hemorrhage, extraaxial collection, or mass effect.  No CT evidence of acute infarct. Mild to moderate presumed chronic small vessel ischemic changes. Mild to moderate generalized volume loss. No hydrocephalus.     VISUALIZED ORBITS/SINUSES/MASTOIDS: Prior bilateral cataract surgery. Visualized portions of the orbits are otherwise unremarkable. No paranasal sinus mucosal disease. No middle ear or mastoid effusion.    BONES/SOFT TISSUES: No acute abnormality. Subcutaneous nodule in the right frontal scalp measuring approximately 9 mm in diameter (series 2 image 20).      Impression    IMPRESSION:  1.  No CT evidence for acute intracranial process.  2.  Brain atrophy and presumed chronic microvascular ischemic changes as above.  3.  Subcutaneous nodule in the right frontal scalp measuring approximately 9 mm in diameter, new from the prior exam. Correlate with direct visualization.   CT Cervical Spine w/o Contrast    Narrative    EXAM: CT CERVICAL SPINE W/O CONTRAST  LOCATION: [facility]  DATE: 2/17/2024    INDICATION: Traumatic injury.  COMPARISON: None.  TECHNIQUE: Routine CT Cervical Spine without IV contrast. Multiplanar reformats. Dose reduction techniques were used.    FINDINGS:  VERTEBRA: Straightening of usual cervical lordosis. Vertebral body height is normal. Slight anterolisthesis of C3 No fracture or posttraumatic  subluxation.     CANAL/FORAMINA: No high-grade central canal or neural foraminal stenosis.    PARASPINAL: No extraspinal abnormality.      Impression    IMPRESSION:  1.  No fracture or posttraumatic subluxation.  2.  No high-grade spinal canal or neural foraminal stenosis.  3.  Straightening of the usual cervical lordosis.     CT Chest w Contrast    Narrative    EXAM: CT CHEST WITH CONTRAST  LOCATION: [facility]  DATE: 02/17/2024    INDICATION: Left chest pain after a fall, possible rib fractures.  COMPARISON: None.  TECHNIQUE: CT chest with IV contrast. Multiplanar reformats were obtained. Dose reduction techniques were used.    CONTRAST: 58 mL Isovue 370.    FINDINGS:   LUNGS AND PLEURA: No pneumothorax. Minimal posterior left hemothorax. No definitive evidence for active contrast extravasation into the high-density blood products within the left pleural space. Mild atelectasis left lung base.    MEDIASTINUM/AXILLAE: Minimal sliding esophageal hiatal hernia. No pericardial fluid. Normal caliber esophagus without dissection. Central pulmonary arteries clear.    CORONARY ARTERY CALCIFICATION: Severe.    UPPER ABDOMEN: No evidence for active contrast extravasation or active bleeding from the spleen. There is a small amount of fluid immediately adjacent to the spleen. No evidence for perinephric fluid. Large calculi within the left intrarenal collecting   system with a 1.3 cm ovoid calculus. Cholecystectomy.    MUSCULOSKELETAL: Acute displaced posterior left ninth -twelfth rib fractures with marked displacement of the ninth and tenth rib fractures of. Mild displacement of the tenth, eleventh and twelfth rib fractures of. The twelfth rib fracture has two   separate sites of fracture, one at the left rib head and neck region and one more posteriorly. Small hematoma in the left paraspinal musculature adjacent. Multiple old healed left anterior and lateral lower rib fractures. No right-sided  rib fractures.   Right shoulder arthroplasty. Degenerative hypertrophic changes throughout the spine. Vertebroplasty at the thoracolumbar junction involving T12, L1 and L2. Chronic partially sclerotic oblique fracture involving the anteroinferior aspect of the T7   vertebral body without evidence for significant vertebral body loss of height.      Impression    IMPRESSION:   1.  Markedly displaced posterior left rib fractures involving the left posterior ninth and twelfth ribs with two separate fractures in the posterior left twelfth rib. The left ninth and tenth ribs are markedly displaced. These fractures are surrounded by   a left paraspinal hematoma which is somewhat difficult to to delineate.    2.  Minimal high density pleural fluid in the left pleural space compatible with hemothorax. No active contrast extravasation or bleeding.    3.  Minimal amount of fluid along the medial aspect of the spleen without evidence for active contrast extravasation or active bleeding to the spleen.    4.  No pneumothorax. Mild atelectasis left lung base.         For the majority of the shift this patient's behavior was Green     Cardiac Rhythm: N/A  Pt needs tele? No  Skin/wound Issues: None    Code Status: Full Code    Pain control: good    Nausea control: pt had none    Abnormal labs/tests/findings requiring intervention: none    Patient tested for COVID 19 prior to admission: NO     OBS brochure/video discussed/provided to patient/family: N/A     Family present during ED course? No     Family Comments/Social Situation comments: Lives independent living     Tasks needing completion: None    Tiara Vincent RN

## 2024-02-17 NOTE — MEDICATION SCRIBE - ADMISSION MEDICATION HISTORY
Medication Scribe Admission Medication History    Admission medication history is complete. The information provided in this note is only as accurate as the sources available at the time of the update.    Information Source(s): Patient, Family member, Clinic records, and CareEverywhere/SureScripts via  by phone with daughter Erika [phone] and in room with patient and finished at desk.    Pertinent Information: Daughter Erika sets up patient's med for her.  Patient's Dexcom sensor on arm is .  There is a new shipment on its way.  Only taking 4 units of NPH in am now.  Takes 1/2 tab of 40 mg Atorvastatin daily.    Changes made to PTA medication list:  Added: Aspirin 81 mg, Calcium Citrate + D bid, Vitamin B12 500 mcg, EQ MVI, Memantine 10 mg, Blood Glucose test strip, Dexcom G6, Krill Oil 500 mg.  Deleted: Freestyle Che, Calcium Citrate, Vitamin B12 1000 mcg, Pediatric MVI, Trazodone 50 mg.  Changed: Atorvastatin from 40 mg daily to 20 mg daily, Vitamin D from 3,000 units daily to 2,000 units daily, NPH Insulin from 5 units daily to 4 units daily.    Allergies reviewed with patient and updates made in EHR: yes, removed Aspirin from allergy tab as patient is now taking Aspirin due to TIA.    Medication History Completed By: Tonja Michaud 2024 10:36 AM    PTA Med List   Medication Sig Note Last Dose    acetaminophen (TYLENOL) 325 MG tablet Take 2 tablets (650 mg) by mouth every 4 hours as needed for mild pain or fever  2024 at 0330    aspirin 81 MG EC tablet Take 81 mg by mouth every evening  2024 at 2100    atorvastatin (LIPITOR) 40 MG tablet Take 20 mg by mouth daily  2024 at am    blood glucose (ACCU-CHEK GUIDE) test strip 1 strip by In Vitro route 4 times daily  2024 at pm    buPROPion (WELLBUTRIN SR) 150 MG 12 hr tablet Take 150 mg by mouth 2 times daily  2024 at 2100    Calcium Citrate-Vitamin D (CALCIUM CITRATE + D3 PO) Take 1 tablet by mouth every evening  2024 at  2100    Calcium Citrate-Vitamin D (CALCIUM CITRATE + D3 PO) Take 2 tablets by mouth every morning  2024 at am    Cholecalciferol (VITAMIN D3 PO) Take 2,000 Units by mouth daily  2024 at am    Continuous Blood Gluc Sensor (DEXCOM G6 SENSOR) MISC 1 Device Change every 10 days.  on now but  at refill in transit    cyanocobalamin (VITAMIN B-12) 500 MCG tablet Take 500 mcg by mouth daily  2024 at am    insulin NPH (HUMULIN N VIAL) 100 UNIT/ML susp Inject 4 Units Subcutaneous daily before breakfast  2024 at am    insulin regular (NOVOLIN R VIAL) 100 UNIT/ML VIAL 3 units before breakfast and 3 units at supper Plus 1 units for every 50 mg above 120 : approximately 54 units daily 2024: Just doing scheduled doses.  Does not do sliding scale. 2024 at pm    Krill Oil 500 MG CAPS Take 1 capsule by mouth every evening  2024 at 2100    levothyroxine (SYNTHROID/LEVOTHROID) 100 MCG tablet Take 1 tablet by mouth daily  2024 at am    lisinopril (PRINIVIL,ZESTRIL) 40 MG tablet Take 1 tablet (40 mg) by mouth daily (Patient taking differently: Take 40 mg by mouth every evening)  2024 at 2100    memantine (NAMENDA) 10 MG tablet Take 10 mg by mouth 2 times daily  2024 at 2100    Multiple Vitamins-Minerals (EQ COMPLETE MULTIVIT ADULT 50+) TABS Take 1 tablet by mouth daily  2024 at am    omeprazole (PRILOSEC) 20 MG capsule Take 20 mg by mouth 2 times daily   2024 at 2100    order for DME Equipment being ordered: home blood pressure machine/monitor.  on hand at not using

## 2024-02-17 NOTE — H&P
[facility] History and Physical    Marcia Lawrence MRN# [id number]   Age: 79 year old YOB: 1945     Date of Admission:  2/17/2024      Primary care provider: Anna Champion          Assessment and Plan:   Assessment & Plan         Fall, initial encounter  Sounds like a mechanical fall due to tripping/losing her balance when ambulating without her walker which she normally uses.  No apparent injury other than to left side/back.    - ER/surgery managing trauma evaluation/survey.    - no other pain or injuries, sounds like just a mechanical fall.   - physical therapy/occupational therapy consulted.  Patient says she plans to always use her walker in the future.       Closed fracture of multiple ribs of left side, initial encounter with displacement and left paraspinal hematoma   Initial concern for possible splenic laceration on CT chest, but looks more like reactive free fluid per CT abdomen  Likely minimal left hemothorax   Initial concern for splenic laceration/bleed on chest CT, less likely on CT abdomen  - CT on admission showed markedly displaced posterior left rib fractures involving the left posterior 9th-12th ribs with 2 separate fractures in the posterior left 12th rib with left paraspinal hematoma.    - patient having just mild pain (3-4/10) on admission in back/left flank, no other pain or apparent injuries.   - respiratory status stable on admission  - hemoglobin stable, not on any blood thinners besides aspirin (held on admission as below)  - surgery consulted for trauma management  - rib fracture order set, admitted to ICU per protocol   - rechecking hemoglobin at 1600 and in AM.  Chest x-ray in AM.    - tylenol and dilaudid (0.2 mg IV q3h) prn for pain     Contrast x 2  Patient initially only had CT chest.  With possible splenic injury I discussed this with surgery who agreed that CT abdomen was also needed, and needed with contrast.    - gave 1L LR bolus in ER to help  protect kidneys, then 75cc/hour on admission - reassess 2/18 and follow creatinine.     Type 2 diabetes mellitus with complication (H)  - continued home NPH 4u daily.    - continued home novolog 3u with breakfast and supper starting pm 2/17   - started on very low sliding scale insulin   - high consistent carb diet    - A1c pending     History of Cerebrovascular accident (CVA), unspecified mechanism (H)   Held home aspirin on admission - reassess 2/18       Essential hypertension with goal blood pressure less than 140/90  Blood pressure normal to elevated on admission   - continued home lisinopril       Acquired hypothyroidism  Continued home levothyroxine     Dementia  At baseline.  Very pleasant but not always a great historian regarding details.    Continued home namenda.    - daughter sets up medications at home.        Esophageal reflux  Continued home omeprazole or equivalent PPI      Adjustment disorder with mixed anxiety and depressed mood  Continued home wellbutrin       S/p Bariatric surgery (GASTRIC BYPASS)  Continue home supplements on discharge       Hyperlipidemia LDL goal <100  Continued home Lipitor       Prophylaxis  Mechanical DVT prophylaxis given bleeding risks from trauma as above     Lines  PIV    Diet  High consistent carb     Clinically Significant Risk Factors Present on Admission                  # Hypertension: Noted on problem list                    Code Status  DNR/DNI - discussed in detail with patient on admission.  Has been full code before but was 10 years ago.    She is very confident about DNR, says DNI but seemed a bit less certain.  In the unlikely event that she is nearing intubation would try to readdress this to confirm still DNI.   - wants all other cares up to resuscitation/intubation.           Disposition  Admitted to ICU as above, anticipate at least 2 days inpatient pending surgery's evaluation and clearance from a trauma standpoint.                Chief Complaint:  "  fall  History is obtained from the patient          History of Present Illness:   This patient is a 79 year old  female with a significant past medical history as above who presents with a mechanical fall and back/flank pain.   Patient is not the greatest historian given her known dementia, and does not provide great details to her fall, but says she has been at baseline recently.  Had lunch with her daughters yesterday and was doing well.  Then last night she got up and started walking without her walker (which she usually uses but thought she'd be ok without that time) and tripped and fell onto her left side striking a small door or some sort.  She denies having hit her head and says she did not lose consciousness and was not light-headed, dizzy or having any other symptoms at the time.  She had immediate pain in her right side/back.  No other pain or apparent injury.    Mental status at baseline still.  Feels at baseline besides pain, which is \"not too bad\" at a 3-4/10 and unchanged since the fall.    Not on any blood thinners besides aspirin.      No tobacco  No alcohol or illicit drug use    Lives in assisted living and normally uses a walker for ambulation.               Past Medical History:   I have reviewed this patient's past medical history.  Patient Active Problem List    Diagnosis Date Noted    Fall, initial encounter 02/17/2024     Priority: Medium    Closed fracture of multiple ribs of left side, initial encounter 02/17/2024     Priority: Medium    Acute midline low back pain with sciatica, sciatica laterality unspecified 09/30/2016     Priority: Medium    Essential hypertension with goal blood pressure less than 140/90 09/09/2016     Priority: Medium    Adjustment disorder with depressed mood 03/15/2016     Priority: Medium    Type 2 diabetes mellitus with complication (H) 10/24/2015     Priority: Medium    Intestinal malabsorption 10/17/2014     Priority: Medium     Problem list name " updated by automated process. Provider to review      Lumbar compression fracture (H) 10/17/2014     Priority: Medium     9/29 mri       Age-related osteoporosis with current pathological fracture 10/17/2014     Priority: Medium     Complicated by bariatric surgery h.o         Bariatric surgery status (GASTRIC BYPASS) 08/18/2014     Priority: Medium     2008        Hyperlipidemia LDL goal <100 08/18/2014     Priority: Medium    Health Care Home 08/14/2014     Priority: Medium     State Tier Level:    Status:  Declined  Care Coordinator:  Mallorie Roman    See Letters for [facility] Care Plan  Date:  December 3, 2015            Advanced directives, counseling/discussion 07/29/2014     Priority: Medium    Adjustment disorder with mixed anxiety and depressed mood 07/29/2014     Priority: Medium    Compression fracture of L1 lumbar vertebra (H) 07/28/2014     Priority: Medium    Esophageal reflux 11/09/2012     Priority: Medium    PROXIMAL LEFT HUMERUS FRACTURE 12/03/2007     Priority: Medium              Past Surgical History:   I have reviewed this patient's past surgical history   Past Surgical History:   Procedure Laterality Date    GASTRIC BYPASS      hernia repair               Social History:   I have reviewed this patient's social history   Social History     Tobacco Use    Smoking status: Never    Smokeless tobacco: Never   Substance Use Topics    Alcohol use: No             Family History:   I have reviewed this patient's family history   Family History   Problem Relation Age of Onset    Diabetes Maternal Grandmother     Diabetes Maternal Grandfather              Immunizations:   Immunizations are current          Allergies:     Allergies   Allergen Reactions    Percocet [Oxycodone-Acetaminophen] Other (See Comments)     Delirium     Aspirin Other (See Comments)     Gastric bypass    Cefuroxime Swelling    Pcn [Penicillins] Hives    Sulfa Antibiotics Rash             Medications:   (Not in a hospital admission)      "       Review of Systems:    ROS: 10 point ROS neg other than the symptoms noted above in the HPI.           Physical Exam:   Blood pressure 135/71, pulse 78, temperature 98.1  F (36.7  C), temperature source Oral, resp. rate 20, height 1.549 m (5' 1\"), weight 54.4 kg (120 lb), SpO2 96%, not currently breastfeeding.  Temperatures:  Current - Temp: 98.1  F (36.7  C); Max - Temp  Av.1  F (36.7  C)  Min: 98.1  F (36.7  C)  Max: 98.1  F (36.7  C)  Respiration range: Resp  Av  Min: 20  Max: 20  Pulse range: Pulse  Av  Min: 78  Max: 78  Blood pressure range: Systolic (24hrs), Av , Min:135 , Max:184   ; Diastolic (24hrs), Av, Min:70, Max:81    Pulse oximetry range: SpO2  Av.2 %  Min: 93 %  Max: 97 %  No intake or output data in the 24 hours ending 24 09  EXAM:  General: awake and alert, NAD, oriented x 3  Head: normocephalic  Neck: unremarkable, no lymphadenopathy   HEENT: oropharynx pink and moist    Heart: Regular rate and rhythm, no murmurs, rubs, or gallops  Lungs: clear to auscultation bilaterally with good air movement throughout  Abdomen: soft, non-tender, no masses or organomegaly  Extremities: no edema in lower extremities   Skin unremarkable as visualized.             Data:     Results for orders placed or performed during the hospital encounter of 24 (from the past 24 hour(s))   CBC with platelets differential    Narrative    The following orders were created for panel order CBC with platelets differential.  Procedure                               Abnormality         Status                     ---------                               -----------         ------                     CBC with platelets and d...[[id number]]                      Final result                 Please view results for these tests on the individual orders.   Basic metabolic panel   Result Value Ref Range    Sodium 137 135 - 145 mmol/L    Potassium 4.3 3.4 - 5.3 mmol/L    Chloride 101 98 - 107 mmol/L "    Carbon Dioxide (CO2) 25 22 - 29 mmol/L    Anion Gap 11 7 - 15 mmol/L    Urea Nitrogen 19.8 8.0 - 23.0 mg/dL    Creatinine 0.77 0.51 - 0.95 mg/dL    GFR Estimate 77 >60 mL/min/1.73m2    Calcium 9.3 8.8 - 10.2 mg/dL    Glucose 191 (H) 70 - 99 mg/dL   CBC with platelets and differential   Result Value Ref Range    WBC Count 9.6 4.0 - 11.0 10e3/uL    RBC Count 4.10 3.80 - 5.20 10e6/uL    Hemoglobin 12.8 11.7 - 15.7 g/dL    Hematocrit 38.2 35.0 - 47.0 %    MCV 93 78 - 100 fL    MCH 31.2 26.5 - 33.0 pg    MCHC 33.5 31.5 - 36.5 g/dL    RDW 12.7 10.0 - 15.0 %    Platelet Count 172 150 - 450 10e3/uL    % Neutrophils 72 %    % Lymphocytes 19 %    % Monocytes 7 %    % Eosinophils 2 %    % Basophils 0 %    % Immature Granulocytes 0 %    NRBCs per 100 WBC 0 <1 /100    Absolute Neutrophils 6.9 1.6 - 8.3 10e3/uL    Absolute Lymphocytes 1.8 0.8 - 5.3 10e3/uL    Absolute Monocytes 0.7 0.0 - 1.3 10e3/uL    Absolute Eosinophils 0.1 0.0 - 0.7 10e3/uL    Absolute Basophils 0.0 0.0 - 0.2 10e3/uL    Absolute Immature Granulocytes 0.0 <=0.4 10e3/uL    Absolute NRBCs 0.0 10e3/uL   Head CT w/o contrast    Narrative    EXAM: CT HEAD W/O CONTRAST  LOCATION: [facility]  DATE: 2/17/2024    INDICATION: head injury, fall  COMPARISON: 11/30/2021  TECHNIQUE: Routine CT Head without IV contrast. Multiplanar reformats. Dose reduction techniques were used.    FINDINGS:  INTRACRANIAL CONTENTS: No intracranial hemorrhage, extraaxial collection, or mass effect.  No CT evidence of acute infarct. Mild to moderate presumed chronic small vessel ischemic changes. Mild to moderate generalized volume loss. No hydrocephalus.     VISUALIZED ORBITS/SINUSES/MASTOIDS: Prior bilateral cataract surgery. Visualized portions of the orbits are otherwise unremarkable. No paranasal sinus mucosal disease. No middle ear or mastoid effusion.    BONES/SOFT TISSUES: No acute abnormality. Subcutaneous nodule in the right frontal scalp measuring  approximately 9 mm in diameter (series 2 image 20).      Impression    IMPRESSION:  1.  No CT evidence for acute intracranial process.  2.  Brain atrophy and presumed chronic microvascular ischemic changes as above.  3.  Subcutaneous nodule in the right frontal scalp measuring approximately 9 mm in diameter, new from the prior exam. Correlate with direct visualization.   CT Cervical Spine w/o Contrast    Narrative    EXAM: CT CERVICAL SPINE W/O CONTRAST  LOCATION: [facility]  DATE: 2/17/2024    INDICATION: Traumatic injury.  COMPARISON: None.  TECHNIQUE: Routine CT Cervical Spine without IV contrast. Multiplanar reformats. Dose reduction techniques were used.    FINDINGS:  VERTEBRA: Straightening of usual cervical lordosis. Vertebral body height is normal. Slight anterolisthesis of C3 No fracture or posttraumatic subluxation.     CANAL/FORAMINA: No high-grade central canal or neural foraminal stenosis.    PARASPINAL: No extraspinal abnormality.      Impression    IMPRESSION:  1.  No fracture or posttraumatic subluxation.  2.  No high-grade spinal canal or neural foraminal stenosis.  3.  Straightening of the usual cervical lordosis.     CT Chest w Contrast    Narrative    EXAM: CT CHEST WITH CONTRAST  LOCATION: [facility]  DATE: 02/17/2024    INDICATION: Left chest pain after a fall, possible rib fractures.  COMPARISON: None.  TECHNIQUE: CT chest with IV contrast. Multiplanar reformats were obtained. Dose reduction techniques were used.    CONTRAST: 58 mL Isovue 370.    FINDINGS:   LUNGS AND PLEURA: No pneumothorax. Minimal posterior left hemothorax. No definitive evidence for active contrast extravasation into the high-density blood products within the left pleural space. Mild atelectasis left lung base.    MEDIASTINUM/AXILLAE: Minimal sliding esophageal hiatal hernia. No pericardial fluid. Normal caliber esophagus without dissection. Central pulmonary arteries  clear.    CORONARY ARTERY CALCIFICATION: Severe.    UPPER ABDOMEN: No evidence for active contrast extravasation or active bleeding from the spleen. There is a small amount of fluid immediately adjacent to the spleen. No evidence for perinephric fluid. Large calculi within the left intrarenal collecting   system with a 1.3 cm ovoid calculus. Cholecystectomy.    MUSCULOSKELETAL: Acute displaced posterior left ninth -twelfth rib fractures with marked displacement of the ninth and tenth rib fractures of. Mild displacement of the tenth, eleventh and twelfth rib fractures of. The twelfth rib fracture has two   separate sites of fracture, one at the left rib head and neck region and one more posteriorly. Small hematoma in the left paraspinal musculature adjacent. Multiple old healed left anterior and lateral lower rib fractures. No right-sided rib fractures.   Right shoulder arthroplasty. Degenerative hypertrophic changes throughout the spine. Vertebroplasty at the thoracolumbar junction involving T12, L1 and L2. Chronic partially sclerotic oblique fracture involving the anteroinferior aspect of the T7   vertebral body without evidence for significant vertebral body loss of height.      Impression    IMPRESSION:   1.  Markedly displaced posterior left rib fractures involving the left posterior ninth and twelfth ribs with two separate fractures in the posterior left twelfth rib. The left ninth and tenth ribs are markedly displaced. These fractures are surrounded by   a left paraspinal hematoma which is somewhat difficult to to delineate.    2.  Minimal high density pleural fluid in the left pleural space compatible with hemothorax. No active contrast extravasation or bleeding.    3.  Minimal amount of fluid along the medial aspect of the spleen without evidence for active contrast extravasation or active bleeding to the spleen.    4.  No pneumothorax. Mild atelectasis left lung base.     CT Abdomen Pelvis w Contrast     Narrative    EXAM: CT ABDOMEN PELVIS W CONTRAST  LOCATION: [facility]  DATE: 2/17/2024    INDICATION: blunt trauma w  multiple rib fractures and hemothorax, fluid around spleen on chest CT  COMPARISON: None.  TECHNIQUE: CT scan of the abdomen and pelvis was performed following injection of IV contrast. Multiplanar reformats were obtained. Dose reduction techniques were used.  CONTRAST: Iodinated IV contrast    FINDINGS:     LOWER CHEST: Please see separately dictated CT chest dated earlier same day.     HEPATOBILIARY: Normal liver parenchyma. Reservoir effect biliary dilation. Cholecystectomy.     PANCREAS: Normal.     SPLEEN: Minimal perisplenic fluid. No appreciable splenic laceration or subcapsular hematoma. Spleen enhances normally.     ADRENAL GLANDS: Normal.     KIDNEYS/BLADDER: Normal kidneys. No hydronephrosis. Contrast is seen within both renal collecting systems and the urinary bladder. Normal wall thickness and contour of the urinary bladder.     BOWEL: Prior gastric surgery. No obstruction. Appendix is not visualized. No secondary findings of acute appendicitis in the right lower quadrant. No bowel wall thickening or pneumatosis. No pneumoperitoneum.    LYMPH NODES: No pathologically enlarged lymph nodes.    VASCULATURE: Nonaneurysmal aorta with moderate aortoiliac calcifications.     PELVIC ORGANS: No pelvic masses.     MUSCULOSKELETAL: Bones are diffusely demineralized. Multilevel thoracolumbar compression deformities, status post T12-L3 vertebroplasties. Left-sided rib fractures, better characterized on same day CT chest. Left chest wall extrapleural hematoma. Two   rounded soft tissue densities within the anterior abdominal wall near the umbilicus, with associated dystrophic calcifications, favored to represent injection granulomas. Soft tissue stranding overlying the left hip.       Impression    IMPRESSION:  1.  Minimal free fluid abutting the spleen, likely reactive  given left-sided rib fractures. No splenic laceration or subcapsular hematoma.  2.  No other traumatic findings in the abdomen or pelvis.     All cardiac studies reviewed by me.  All imaging studies reviewed by me.    Attestation:  I have reviewed today's vital signs, notes, medications, labs and imaging.  Amount of time performed on this history and physical: 90 minutes.        Partha Godwin MD

## 2024-02-18 ENCOUNTER — APPOINTMENT (OUTPATIENT)
Dept: PHYSICAL THERAPY | Facility: CLINIC | Age: 79
DRG: 200 | End: 2024-02-18
Payer: MEDICARE

## 2024-02-18 ENCOUNTER — APPOINTMENT (OUTPATIENT)
Dept: GENERAL RADIOLOGY | Facility: CLINIC | Age: 79
DRG: 200 | End: 2024-02-18
Attending: FAMILY MEDICINE
Payer: MEDICARE

## 2024-02-18 ENCOUNTER — APPOINTMENT (OUTPATIENT)
Dept: OCCUPATIONAL THERAPY | Facility: CLINIC | Age: 79
DRG: 200 | End: 2024-02-18
Payer: MEDICARE

## 2024-02-18 LAB
ANION GAP SERPL CALCULATED.3IONS-SCNC: 10 MMOL/L (ref 7–15)
BUN SERPL-MCNC: 15.4 MG/DL (ref 8–23)
CALCIUM SERPL-MCNC: 8.6 MG/DL (ref 8.8–10.2)
CHLORIDE SERPL-SCNC: 102 MMOL/L (ref 98–107)
CREAT SERPL-MCNC: 0.6 MG/DL (ref 0.51–0.95)
DEPRECATED HCO3 PLAS-SCNC: 26 MMOL/L (ref 22–29)
EGFRCR SERPLBLD CKD-EPI 2021: 90 ML/MIN/1.73M2
ERYTHROCYTE [DISTWIDTH] IN BLOOD BY AUTOMATED COUNT: 12.8 % (ref 10–15)
GLUCOSE BLDC GLUCOMTR-MCNC: 110 MG/DL (ref 70–99)
GLUCOSE BLDC GLUCOMTR-MCNC: 118 MG/DL (ref 70–99)
GLUCOSE BLDC GLUCOMTR-MCNC: 154 MG/DL (ref 70–99)
GLUCOSE BLDC GLUCOMTR-MCNC: 176 MG/DL (ref 70–99)
GLUCOSE SERPL-MCNC: 148 MG/DL (ref 70–99)
HCT VFR BLD AUTO: 33.7 % (ref 35–47)
HGB BLD-MCNC: 11.1 G/DL (ref 11.7–15.7)
HOLD SPECIMEN: NORMAL
MAGNESIUM SERPL-MCNC: 1.5 MG/DL (ref 1.7–2.3)
MAGNESIUM SERPL-MCNC: 2.7 MG/DL (ref 1.7–2.3)
MCH RBC QN AUTO: 31.2 PG (ref 26.5–33)
MCHC RBC AUTO-ENTMCNC: 32.9 G/DL (ref 31.5–36.5)
MCV RBC AUTO: 95 FL (ref 78–100)
PHOSPHATE SERPL-MCNC: 3.8 MG/DL (ref 2.5–4.5)
PLATELET # BLD AUTO: 138 10E3/UL (ref 150–450)
POTASSIUM SERPL-SCNC: 3.9 MMOL/L (ref 3.4–5.3)
RBC # BLD AUTO: 3.56 10E6/UL (ref 3.8–5.2)
SODIUM SERPL-SCNC: 138 MMOL/L (ref 135–145)
WBC # BLD AUTO: 6 10E3/UL (ref 4–11)

## 2024-02-18 PROCEDURE — 94150 VITAL CAPACITY TEST: CPT

## 2024-02-18 PROCEDURE — 97535 SELF CARE MNGMENT TRAINING: CPT | Mod: GO

## 2024-02-18 PROCEDURE — 97161 PT EVAL LOW COMPLEX 20 MIN: CPT | Mod: GP | Performed by: PHYSICAL THERAPIST

## 2024-02-18 PROCEDURE — 94799 UNLISTED PULMONARY SVC/PX: CPT

## 2024-02-18 PROCEDURE — 99232 SBSQ HOSP IP/OBS MODERATE 35: CPT | Performed by: FAMILY MEDICINE

## 2024-02-18 PROCEDURE — 97530 THERAPEUTIC ACTIVITIES: CPT | Mod: GP | Performed by: PHYSICAL THERAPIST

## 2024-02-18 PROCEDURE — 97165 OT EVAL LOW COMPLEX 30 MIN: CPT | Mod: GO

## 2024-02-18 PROCEDURE — 250N000011 HC RX IP 250 OP 636: Performed by: FAMILY MEDICINE

## 2024-02-18 PROCEDURE — 83735 ASSAY OF MAGNESIUM: CPT | Performed by: FAMILY MEDICINE

## 2024-02-18 PROCEDURE — 36415 COLL VENOUS BLD VENIPUNCTURE: CPT | Performed by: FAMILY MEDICINE

## 2024-02-18 PROCEDURE — 85027 COMPLETE CBC AUTOMATED: CPT | Performed by: FAMILY MEDICINE

## 2024-02-18 PROCEDURE — 258N000003 HC RX IP 258 OP 636: Performed by: FAMILY MEDICINE

## 2024-02-18 PROCEDURE — 250N000013 HC RX MED GY IP 250 OP 250 PS 637: Performed by: FAMILY MEDICINE

## 2024-02-18 PROCEDURE — 80048 BASIC METABOLIC PNL TOTAL CA: CPT | Performed by: FAMILY MEDICINE

## 2024-02-18 PROCEDURE — 120N000004 HC R&B MS OVERFLOW

## 2024-02-18 PROCEDURE — 84100 ASSAY OF PHOSPHORUS: CPT | Performed by: FAMILY MEDICINE

## 2024-02-18 PROCEDURE — 999N000157 HC STATISTIC RCP TIME EA 10 MIN

## 2024-02-18 PROCEDURE — 250N000012 HC RX MED GY IP 250 OP 636 PS 637: Performed by: FAMILY MEDICINE

## 2024-02-18 PROCEDURE — 71045 X-RAY EXAM CHEST 1 VIEW: CPT

## 2024-02-18 RX ORDER — MAGNESIUM SULFATE HEPTAHYDRATE 40 MG/ML
4 INJECTION, SOLUTION INTRAVENOUS ONCE
Status: COMPLETED | OUTPATIENT
Start: 2024-02-18 | End: 2024-02-18

## 2024-02-18 RX ADMIN — GABAPENTIN 100 MG: 100 CAPSULE ORAL at 20:13

## 2024-02-18 RX ADMIN — ATORVASTATIN CALCIUM 20 MG: 20 TABLET, FILM COATED ORAL at 07:44

## 2024-02-18 RX ADMIN — MAGNESIUM SULFATE IN WATER FOR 4 G: 40 INJECTION INTRAVENOUS at 08:16

## 2024-02-18 RX ADMIN — INSULIN HUMAN 4 UNITS: 100 INJECTION, SUSPENSION SUBCUTANEOUS at 07:49

## 2024-02-18 RX ADMIN — LISINOPRIL 40 MG: 40 TABLET ORAL at 16:37

## 2024-02-18 RX ADMIN — ACETAMINOPHEN 975 MG: 325 TABLET, FILM COATED ORAL at 20:13

## 2024-02-18 RX ADMIN — MEMANTINE 10 MG: 10 TABLET ORAL at 07:47

## 2024-02-18 RX ADMIN — PANTOPRAZOLE SODIUM 40 MG: 40 TABLET, DELAYED RELEASE ORAL at 16:37

## 2024-02-18 RX ADMIN — ACETAMINOPHEN 975 MG: 325 TABLET, FILM COATED ORAL at 05:45

## 2024-02-18 RX ADMIN — LIDOCAINE 1 PATCH: 4 PATCH TOPICAL at 07:44

## 2024-02-18 RX ADMIN — SODIUM CHLORIDE, POTASSIUM CHLORIDE, SODIUM LACTATE AND CALCIUM CHLORIDE: 600; 310; 30; 20 INJECTION, SOLUTION INTRAVENOUS at 01:15

## 2024-02-18 RX ADMIN — METHOCARBAMOL 500 MG: 500 TABLET ORAL at 11:10

## 2024-02-18 RX ADMIN — METHOCARBAMOL 500 MG: 500 TABLET ORAL at 01:19

## 2024-02-18 RX ADMIN — BUPROPION HYDROCHLORIDE 150 MG: 150 TABLET, EXTENDED RELEASE ORAL at 20:13

## 2024-02-18 RX ADMIN — INSULIN ASPART 3 UNITS: 100 INJECTION, SOLUTION INTRAVENOUS; SUBCUTANEOUS at 07:49

## 2024-02-18 RX ADMIN — PANTOPRAZOLE SODIUM 40 MG: 40 TABLET, DELAYED RELEASE ORAL at 05:46

## 2024-02-18 RX ADMIN — METHOCARBAMOL 500 MG: 500 TABLET ORAL at 20:13

## 2024-02-18 RX ADMIN — LEVOTHYROXINE SODIUM 100 MCG: 100 TABLET ORAL at 05:46

## 2024-02-18 RX ADMIN — ACETAMINOPHEN 975 MG: 325 TABLET, FILM COATED ORAL at 12:07

## 2024-02-18 RX ADMIN — GABAPENTIN 100 MG: 100 CAPSULE ORAL at 13:56

## 2024-02-18 RX ADMIN — GABAPENTIN 100 MG: 100 CAPSULE ORAL at 07:44

## 2024-02-18 RX ADMIN — MEMANTINE 10 MG: 10 TABLET ORAL at 20:12

## 2024-02-18 RX ADMIN — BUPROPION HYDROCHLORIDE 150 MG: 150 TABLET, EXTENDED RELEASE ORAL at 07:44

## 2024-02-18 ASSESSMENT — ACTIVITIES OF DAILY LIVING (ADL)
ADLS_ACUITY_SCORE: 33
ADLS_ACUITY_SCORE: 31
ADLS_ACUITY_SCORE: 33
ADLS_ACUITY_SCORE: 33
ADLS_ACUITY_SCORE: 31
PREVIOUS_RESPONSIBILITIES: LAUNDRY

## 2024-02-18 NOTE — PROGRESS NOTES
02/17/24 1415   Negative Inspiratory Force (NIF)   Negative Inspiratory Force (cm H2O) -25   Effort (NIF) fair   Patient Position (NIF) semi-Saini's   Vital Capacity (VC)   Vital Capacity (mL) 900   Patient Position (VC) semi-Saini's   Effort (VC) fair

## 2024-02-18 NOTE — PROGRESS NOTES
End Of Shift Note     Situation: S/P fall. Multi rib fx's 9-12 on left side with 9th and 10th rib displaced.  Also left side/posterior hematoma, right forehead ecchymosis.     Plan: Pain control, monitor resp status     Subjective/Objective:     Neuro: A & O x 4, using call light appropriately     Cardiac: SR, BP wnl, afebrile     Resp: LS diminished LLL, on roomair- sat 93-95%, RR WNL. IS using when reminded 500-900.     GI/: Diabetic diet, drinking H20 and coffee, voiding in bathroom. Requesting metamucil for today     Endo: Pt has DM2, monitoring BG's and using sliding scale insulin     MSK: Asst 1 w/walker, (uses walker at baseline) Pt steady     Skin: Echymosis right upper forehead, large purple bruise left lower back/ribcage.     LDAs: 1 PIV infusing LR at 75/hr.

## 2024-02-18 NOTE — PROGRESS NOTES
02/18/24 1100   Appointment Info   Signing Clinician's Name / Credentials (OT) Yoly Man, OTR/L   Living Environment   People in Home facility resident   Current Living Arrangements assisted living   Home Accessibility no concerns   Transportation Anticipated family or friend will provide   Living Environment Comments Pt lives a [facility] in [place] in an ind apartment. She has 1 meal/day provided by facility. Adult dtrs come by 5-7 x/wk and assist prn   Self-Care   Usual Activity Tolerance good   Current Activity Tolerance good   Equipment Currently Used at Home shower chair;grab bar, toilet;grab bar, tub/shower;walker, rolling   Fall history within last six months yes   Number of times patient has fallen within last six months 1   Activity/Exercise/Self-Care Comment At baseline, ind/mod I with all ADLs, needs occasional cues for initiation due to dementia   Instrumental Activities of Daily Living (IADL)   Previous Responsibilities laundry   IADL Comments Adult dtr sets up meds in weekly pillboxes and calls 2x/day to remind Pt to take them. Family assists with housekeeping and provides simple meals for Pt to warm up in microwave. Family manages finances/medical care   General Information   Onset of Illness/Injury or Date of Surgery 02/17/24   Referring Physician Dr. Godwin   Patient/Family Therapy Goal Statement (OT) go home   Additional Occupational Profile Info/Pertinent History of Current Problem Marcia Lawrence is a 79 year old female who arrived to [facility] Emergency Department  following fall.  Patient reports she was walking without her walker and stumbled falling onto her left side on a small drawer. Denies loss of conscious; but did hit the right side of her head with a noted ecchymosis. Had immediate pain on her right posterior thorax area. Was able get up and back to bed but had continued pain so came in for evaluation. Patient currently denies head or neck pain. Reports  isolated pain in her posterior chest. Denies numbness, tingling, or weakness. No SOB; no anterior chest pain.  Denies pain elsewhere in the body.  Denies any blood thinners or DOAC except for a daily baby ASA.  Patient does admit to some ongoing forgetfulness. Lives alone in an independent living facility.   Existing Precautions/Restrictions fall   Limitations/Impairments safety/cognitive   General Observations and Info Dtr, Erika, present for session. Pt/dtr agreeable to HC services at [place]   Cognitive Status Examination   Orientation Status person;place   Cognitive Status Comments Baseline dementia, demo's forgetfulness. Dtr reports no change from baseline   Pain Assessment   Patient Currently in Pain No   Posture   Posture forward head position;protracted shoulders   Range of Motion Comprehensive   Comment, General Range of Motion hx B shoulder surgery, FF limited to ~100 degrees bilaterally. B elbows/wrists/hands WFL   Strength Comprehensive (MMT)   General Manual Muscle Testing (MMT) Assessment no strength deficits identified   Comment, General Manual Muscle Testing (MMT) Assessment generalized weakness noted   Coordination   Upper Extremity Coordination No deficits were identified   Transfers   Transfers bed-chair transfer;sit-stand transfer   Transfer Comments completed from FWW level   Transfer Skill: Bed to Chair/Chair to Bed   Bed-Chair El[place] (Transfers) set up   Assistive Device (Bed-Chair Transfers) rolling walker   Sit-Stand Transfer   Sit-Stand [place] (Transfers) set up   Assistive Device (Sit-Stand Transfers) walker, front-wheeled   Balance   Balance Comments Needs AD, uses 4ww at baseline   Activities of Daily Living   BADL Assessment/Intervention lower body dressing   Lower Body Dressing Assessment/Training   Position (Lower Body Dressing) supported sitting   Comment, (Lower Body Dressing) ind   El[place] Level (Lower Body Dressing) doff;don;shoes/slippers   Clinical Impression    Criteria for Skilled Therapeutic Interventions Met (OT) Evaluation only   OT Diagnosis weakness, fall risk   Influenced by the following impairments baseline dementia   OT Problem List-Impairments impacting ADL problems related to;activity tolerance impaired;balance;strength   Assessment of Occupational Performance 1-3 Performance Deficits   Identified Performance Deficits safety with functional reaching outside base of support   Planned Therapy Interventions (OT) ADL retraining   Clinical Decision Making Complexity (OT) problem focused assessment/low complexity   Risk & Benefits of therapy have been explained evaluation/treatment results reviewed;care plan/treatment goals reviewed;risks/benefits reviewed;current/potential barriers reviewed;participants voiced agreement with care plan;participants included;patient;daughter   Clinical Impression Comments Pt paulette's ability to ambulate in room with SBA and FWW. She would benefit from home OT to maximize safety and ind with ADLs in home environment and improve UE strength/endurance. No further IP OT needed.   OT Total Evaluation Time   OT Eval, Low Complexity Minutes (64744) 10   OT Goals   Therapy Frequency (OT) One time eval and treatment   OT Predicted Duration/Target Date for Goal Attainment 02/18/24   OT Goals Lower Body Dressing;Transfers   OT: Lower Body Dressing Modified independent;Goal Met   OT: Transfer Supervision/stand-by assist;Goal Met   Interventions   Interventions Quick Adds Self-Care/Home Management   Self-Care/Home Management   Self-Care/Home Mgmt/ADL, Compensatory, Meal Prep Minutes (13863) 10   Symptoms Noted During/After Treatment (Meal Preparation/Planning Training) none   Treatment Detail/Skilled Intervention Pt engaged in functional mobility in her room from FWW level. She demo's SBA with STS from recliner.  She ambulated to BR and back to recliner with SBA (assisted needed to manage IV pole/tubing). Pt paulette's good safety awareness during  mobility, needs VC to keep head in upright position when walking. She demo's ability to complete LE dressing task with mod I from seated figure 4 position. Pt/dtr educated on role of OT and recommendation for HC services. They are agreement with home OT to maximize safety and ind in home environment.   OT Discharge Planning   OT Discharge Recommendation (DC Rec) home with assist;home with home care occupational therapy   OT Rationale for DC Rec Has strong support from adult children. Would benefit from home OT to maximize safety and ind with ADLs/mobility in home environment.   OT Brief overview of current status SBA mobility with FWW, mod I LE dressing, forgetful   OT Equipment Needed at Discharge   (NA)   Total Session Time   Timed Code Treatment Minutes 10   Total Session Time (sum of timed and untimed services) 20

## 2024-02-18 NOTE — CONSULTS
Care Management Initial Consult    General Information  Assessment completed with: Patient, Children,    Type of CM/SW Visit: Initial Assessment    Primary Care Provider verified and updated as needed: Yes   Readmission within the last 30 days: no previous admission in last 30 days    Reason for Consult: discharge planning  Advance Care Planning: Advance Care Planning Reviewed: no concerns identified        Communication Assessment  Patient's communication style: spoken language (English or Bilingual)    Hearing Difficulty or Deaf: no   Wear Glasses or Blind: yes    Cognitive  Cognitive/Neuro/Behavioral: WDL                      Living Environment:   People in home: alone     Current living Arrangements: apartment      Able to return to prior arrangements: yes     Family/Social Support:  Care provided by: self, child(jimmy)  Provides care for: no one  Marital Status:   Children          Description of Support System: Supportive, Involved    Support Assessment: Adequate family and caregiver support, Adequate social supports    Current Resources:   Patient receiving home care services: No     Community Resources: None  Equipment currently used at home: grab bar, tub/shower, grab bar, toilet, shower chair, walker, rolling  Supplies currently used at home:      Employment/Financial:  Employment Status: retired        Financial Concerns: none      Does the patient's insurance plan have a 3 day qualifying hospital stay waiver?  No    Lifestyle & Psychosocial Needs:  Social Determinants of Health     Food Insecurity: Not on file   Depression: Not at risk (1/10/2019)    PHQ-2     PHQ-2 Score: 0   Housing Stability: Not on file   Tobacco Use: Not on file (8/4/2015)   Financial Resource Strain: Not on file   Alcohol Use: Not on file   Transportation Needs: Not on file   Physical Activity: Not on file   Interpersonal Safety: Not on file   Stress: Not on file   Social Connections: Not on file     Functional Status:  Prior  to admission patient needed assistance:   Dependent ADLs:: Ambulation-walker  Dependent IADLs:: Transportation, Money Management, Medication Management, Shopping, Cleaning     Mental Health Status:  Mental Health Status: No Current Concerns       Chemical Dependency Status:  Chemical Dependency Status: No Current Concerns           Values/Beliefs:  Spiritual, Cultural Beliefs, Latter-day Practices, Values that affect care: no            Additional Information:    CM met with patient and daughter, Erika, at bedside for discharge planning.     [facility] [place] At baseline patient is independent with ADLs. Daughter checks in almost daily and assists with IADLs.     Reviewed therapy recommendations for home care at discharge. Patient and daughter are in agreement and do not have an agency preference.     Patient has been accepted by [facility] (Phone: [phone]) for PT/OT services.     PLAN: Home with HC-PT/OT    NAHUM ARTIS RN

## 2024-02-18 NOTE — PROGRESS NOTES
[facility] Hospitalist Progress Note           Assessment & Plan      Fall, initial encounter  Sounds like a mechanical fall due to tripping/losing her balance when ambulating without her walker which she normally uses.  No apparent injury other than to left side/back.    - ER/surgery managing trauma evaluation/survey.    - no other pain or injuries, sounds like just a mechanical fall.   - physical therapy/occupational therapy consulted.  Patient says she plans to always use her walker in the future.        Closed fracture of multiple ribs of left side, initial encounter with displacement and left paraspinal hematoma   Initial concern for possible splenic laceration on CT chest, but looks more like reactive free fluid per CT abdomen  Likely minimal left hemothorax   Initial concern for splenic laceration/bleed on chest CT, less likely on CT abdomen  - CT on admission showed markedly displaced posterior left rib fractures involving the left posterior 9th-12th ribs with 2 separate fractures in the posterior left 12th rib with left paraspinal hematoma.    - patient having just mild pain (3-4/10) on admission in back/left flank, no other pain or apparent injuries.   - respiratory status stable on admission  - hemoglobin stable, not on any blood thinners besides aspirin (held on admission as below)  - surgery consulted for trauma management  - rib fracture order set, admitted to ICU per protocol   - hemoglobin down slightly -  Chest x-ray 2/17 stable.    - tylenol and dilaudid (0.2 mg IV q3h) prn for pain   - overall improving 2/18      Contrast x 2  Patient initially only had CT chest.  With possible splenic injury I discussed this with surgery who agreed that CT abdomen was also needed, and needed with contrast.    - gave 1L LR bolus in ER to help protect kidneys, then 75cc/hour on admission - reassess 2/18 and follow creatinine.      Type 2 diabetes mellitus with complication (H)  - continued home NPH 4u daily.     - continued home novolog 3u with breakfast and supper starting pm 2/17   - started on very low sliding scale insulin   - high consistent carb diet    - A1c 7.1      History of Cerebrovascular accident (CVA), unspecified mechanism (H)   Held home aspirin on admission - plan to resume 2/19         Essential hypertension with goal blood pressure less than 140/90  Blood pressure normal to elevated on admission   - continued home lisinopril        Acquired hypothyroidism  Continued home levothyroxine      Dementia  At baseline.  Very pleasant but not always a great historian regarding details.    Continued home namenda.    - daughter sets up medications at home.         Esophageal reflux  Continued home omeprazole or equivalent PPI       Adjustment disorder with mixed anxiety and depressed mood  Continued home wellbutrin        S/p Bariatric surgery (GASTRIC BYPASS)  Continue home supplements on discharge        Hyperlipidemia LDL goal <100  Continued home Lipitor      Prophylaxis  Mechanical DVT prophylaxis given bleeding risks from trauma as above      Lines  PIV     Diet  High consistent carb            Clinically Significant Risk Factors Present on Admission                   # Hypertension: Noted on problem list                        Code Status  DNR/DNI - discussed in detail with patient on admission.  Has been full code before but was 10 years ago.    She is very confident about DNR, says DNI but seemed a bit less certain.  In the unlikely event that she is nearing intubation would try to readdress this to confirm still DNI.   - wants all other cares up to resuscitation/intubation.          Diet  Orders Placed This Encounter      High Consistent Carb (75 g CHO per Meal) Diet                    Disposition Plan      Hope for discharge home tomorrow if doing well.  Stable enough to transfer to med-surg status today.                  Partha Godwin MD, MD  Hospitalist Service  [facility]  Securely message with the Vocera Web Console (learn more here)  Text page via [employer] Paging/Directory             Interval History:   Overall patient continues to do surprisingly well.  No fever or chills. No dyspnea.  No other changes.  Minimal pain and no dyspnea.  No back/flank pain at rest.                  Review of Systems:    ROS: 10 point ROS neg other than the symptoms noted above in the HPI.           Medications:   Current active medications and PTA medications reviewed, see medication list for details.            Physical Exam:   Vitals were reviewed  Patient Vitals for the past 24 hrs:   BP Temp Temp src Pulse Resp SpO2 Weight   02/18/24 1600 (!) 159/80 -- -- 61 -- 96 % --   02/18/24 1530 -- -- -- -- -- 95 % --   02/18/24 1500 -- -- -- -- -- 94 % --   02/18/24 1430 -- -- -- -- -- 95 % --   02/18/24 1419 -- 97.4  F (36.3  C) Oral -- -- 96 % --   02/18/24 1400 (!) 158/66 -- -- -- -- -- --   02/18/24 1357 -- -- -- -- -- 98 % --   02/18/24 1348 -- -- -- 66 18 93 % --   02/18/24 1300 -- -- -- 66 25 93 % --   02/18/24 1230 -- -- -- 64 17 (!) 88 % --   02/18/24 1200 (!) 162/82 -- -- 67 11 92 % --   02/18/24 1130 -- -- -- 62 17 94 % --   02/18/24 1100 -- -- -- 64 20 -- --   02/18/24 1030 -- -- -- 65 23 92 % --   02/18/24 1000 113/75 -- -- 66 21 93 % --   02/18/24 0930 -- -- -- 66 16 92 % --   02/18/24 0915 -- -- -- 69 21 93 % --   02/18/24 0900 -- -- -- 70 17 94 % --   02/18/24 0830 -- -- -- 66 20 95 % --   02/18/24 0800 (!) 148/76 -- -- 59 15 96 % --   02/18/24 0739 -- 97.8  F (36.6  C) Oral 63 12 96 % --   02/18/24 0600 (!) 141/70 -- -- 61 14 94 % --   02/18/24 0500 (!) 144/74 -- -- 63 19 95 % --   02/18/24 0410 -- -- -- -- -- -- 58 kg (127 lb 13.9 oz)   02/18/24 0400 120/66 -- -- 61 13 93 % --   02/18/24 0300 119/72 97.8  F (36.6  C) Core 62 17 94 % --   02/18/24 0200 116/60 -- -- 65 15 92 % --   02/18/24 0100 135/66 -- -- 70 15 94 % --   02/18/24 0000 128/64 -- -- 66 15 94 % --   02/17/24 2300 129/85  -- -- 63 13 96 % --   24 117/70 -- -- 64 18 96 % --   248 -- -- -- -- 14 -- --   24 127/60 -- -- 63 13 96 % --   24 128/63 -- -- 63 10 95 % --   24 1940 -- 97.4  F (36.3  C) Oral 69 13 95 % --   24 1746 (!) 140/101 -- -- 69 18 96 % --       Temperatures:  Current - Temp: 97.4  F (36.3  C); Max - Temp  Av.6  F (36.4  C)  Min: 97.4  F (36.3  C)  Max: 97.8  F (36.6  C)  Respiration range: Resp  Av.4  Min: 10  Max: 25  Pulse range: Pulse  Av.8  Min: 59  Max: 70  Blood pressure range: Systolic (24hrs), Av , Min:113 , Max:162   ; Diastolic (24hrs), Av, Min:60, Max:101    Pulse oximetry range: SpO2  Av.2 %  Min: 88 %  Max: 98 %  I/O last 3 completed shifts:  In: 1707.5 [P.O.:550; I.V.:1157.5]  Out: -     Intake/Output Summary (Last 24 hours) at 2024 1609  Last data filed at 2024 0900  Gross per 24 hour   Intake 1707.5 ml   Output --   Net 1707.5 ml     EXAM:  General: awake and alert, NAD, oriented x 3  Head: normocephalic  Neck: unremarkable, no lymphadenopathy   HEENT: oropharynx pink and moist    Heart: Regular rate and rhythm, no murmurs, rubs, or gallops  Lungs: clear to auscultation bilaterally with good air movement throughout  Abdomen: soft, non-tender, no masses or organomegaly  Extremities: no edema in lower extremities   Skin unremarkable as visualized.               Data:     Reviewed data:  Results for orders placed or performed during the hospital encounter of 24 (from the past 24 hour(s))   Glucose by meter   Result Value Ref Range    GLUCOSE BY METER POCT 128 (H) 70 - 99 mg/dL   Glucose by meter   Result Value Ref Range    GLUCOSE BY METER POCT 138 (H) 70 - 99 mg/dL   XR Chest Port 1 View    Narrative    EXAM: XR CHEST PORT 1 VIEW  LOCATION: [facility]  DATE: 2024    INDICATION: rib fractures, trace hemothorax, compare to previous  COMPARISON: CT 2024      Impression     IMPRESSION: Retrocardiac opacity left lung base correlating the basilar atelectasis and likely minimal left basilar pleural fluid. Right lung clear. Normal heart size and pulmonary vascularity. Aortic calcification. Displaced posterior left ninth and   10th rib fractures. Bilateral shoulder arthroplasties.   Magnesium   Result Value Ref Range    Magnesium 1.5 (L) 1.7 - 2.3 mg/dL   Basic metabolic panel   Result Value Ref Range    Sodium 138 135 - 145 mmol/L    Potassium 3.9 3.4 - 5.3 mmol/L    Chloride 102 98 - 107 mmol/L    Carbon Dioxide (CO2) 26 22 - 29 mmol/L    Anion Gap 10 7 - 15 mmol/L    Urea Nitrogen 15.4 8.0 - 23.0 mg/dL    Creatinine 0.60 0.51 - 0.95 mg/dL    GFR Estimate 90 >60 mL/min/1.73m2    Calcium 8.6 (L) 8.8 - 10.2 mg/dL    Glucose 148 (H) 70 - 99 mg/dL   Phosphorus   Result Value Ref Range    Phosphorus 3.8 2.5 - 4.5 mg/dL   CBC with platelets   Result Value Ref Range    WBC Count 6.0 4.0 - 11.0 10e3/uL    RBC Count 3.56 (L) 3.80 - 5.20 10e6/uL    Hemoglobin 11.1 (L) 11.7 - 15.7 g/dL    Hematocrit 33.7 (L) 35.0 - 47.0 %    MCV 95 78 - 100 fL    MCH 31.2 26.5 - 33.0 pg    MCHC 32.9 31.5 - 36.5 g/dL    RDW 12.8 10.0 - 15.0 %    Platelet Count 138 (L) 150 - 450 10e3/uL   Glucose by meter   Result Value Ref Range    GLUCOSE BY METER POCT 118 (H) 70 - 99 mg/dL   Glucose by meter   Result Value Ref Range    GLUCOSE BY METER POCT 176 (H) 70 - 99 mg/dL   Magnesium   Result Value Ref Range    Magnesium 2.7 (H) 1.7 - 2.3 mg/dL   Extra Tube    Narrative    The following orders were created for panel order Extra Tube.  Procedure                               Abnormality         Status                     ---------                               -----------         ------                     Extra Purple Top Tube[[id number]]                            Final result                 Please view results for these tests on the individual orders.   Extra Purple Top Tube   Result Value Ref Range    Hold Specimen JI             Attestation:  I have reviewed today's vital signs, notes, medications, labs and imaging.  Amount of time spent managing this patient today:  35 minutes.     Partha Godwin MD

## 2024-02-18 NOTE — PROGRESS NOTES
02/18/24 1100 02/18/24 1121   Appointment Info   Signing Clinician's Name / Credentials (PT)  --  Enedelia Collins, PT, DPT   Living Environment   People in Home facility resident facility resident   Current Living Arrangements assisted living assisted living   Home Accessibility no concerns no concerns   Transportation Anticipated family or friend will provide family or friend will provide   Living Environment Comments Pt lives a [facility] in [place] in an ind apartment. She has 1 meal/day provided by facility. Adult dtrs come by 5-7 x/wk and assist prn Pt lives in independent unit within [facility]   Self-Care   Usual Activity Tolerance good good   Current Activity Tolerance good good   Regular Exercise  --  No   Equipment Currently Used at Home shower chair;grab bar, toilet;grab bar, tub/shower;walker, rolling shower chair;grab bar, toilet;grab bar, tub/shower;walker, rolling   Fall history within last six months yes yes   Number of times patient has fallen within last six months 1 1   Activity/Exercise/Self-Care Comment At baseline, ind/mod I with all ADLs, needs occasional cues for initiation due to dementia  --    General Information   Onset of Illness/Injury or Date of Surgery  --  02/17/24   Referring Physician  --  Partha Alatorre MD   Patient/Family Therapy Goals Statement (PT)  --  return home   Pertinent History of Current Problem (include personal factors and/or comorbidities that impact the POC)  --  Marcia Lawrence is a 79 year old female with no significant concerning past medical history admitted for evaluation of a fall tonight.  Patient reports she was walking and stumbled falling onto her left side on a small door.  Denies striking her head or loss of conscious.  Had immediate pain on her right posterior thorax area.  Was able get her set up and back to bed but had continued pain so came in for evaluation.  Patient currently denies head or neck pain.  Reports isolated pain in her posterior  chest.  Denies numbness, tingling, or weakness.  Reports she was feeling well previously.  Patient does admit to some ongoing forgetfulness.  Lives alone in an independent living facility.   Existing Precautions/Restrictions  --  fall   General Observations  --  Pt alert and agreeable to PT. Dannater in room throughout session.   Cognition   Affect/Mental Status (Cognition)  --  WFL   Pain Assessment   Patient Currently in Pain  --  No   Integumentary/Edema   Integumentary/Edema  --  no deficits were identifed   Posture    Posture  --  Not impaired   Range of Motion (ROM)   Range of Motion  --  ROM is WFL   Strength (Manual Muscle Testing)   Strength (Manual Muscle Testing)  --  Deficits observed during functional mobility   Transfers   Transfers  --  sit-stand transfer   Comment, (Transfers)  --  set up assist, gait belt and FWW   Sit-Stand Transfer   Sit-Stand [place] (Transfers)  --  set up   Assistive Device (Sit-Stand Transfers)  --  walker, front-wheeled   Comment, (Sit-Stand Transfer)  --  set up and assist fro equipment   Gait/Stairs (Locomotion)   Mellette Level (Gait)  --  modified independence;contact guard   Assistive Device (Gait)  --  walker, front-wheeled   Distance in Feet (Gait)  --  10   Sensory Examination   Sensory Perception  --  WFL   Coordination   Coordination  --  no deficits were identified   Muscle Tone   Muscle Tone  --  no deficits were identified   Clinical Impression   Criteria for Skilled Therapeutic Intervention  --  Yes, treatment indicated   PT Diagnosis (PT)  --  deconditioning   Influenced by the following impairments  --  weakness   Functional limitations due to impairments  --  gait, transfers   Clinical Presentation (PT Evaluation Complexity)  --  stable   Clinical Presentation Rationale  --  clinical reasoning   Clinical Decision Making (Complexity)  --  low complexity   Planned Therapy Interventions (PT)  --  balance training;bed mobility training;gait  training;patient/family education;ROM (range of motion);strengthening;transfer training;risk factor education;home program guidelines   Risk & Benefits of therapy have been explained  --  evaluation/treatment results reviewed;care plan/treatment goals reviewed;risks/benefits reviewed;current/potential barriers reviewed;participants voiced agreement with care plan;participants included;patient;daughter   Clinical Impression Comments  --  Pt presents after fall at home. Exhibits close to baseline mobility with some deficits. Will benefit from HC PT to reduce risk of falling.   PT Total Evaluation Time   PT Richelle Low Complexity Minutes (04430)  --  5   Physical Therapy Goals   PT Frequency  --  3x/week   PT Goals  --  Bed Mobility;Transfers;Gait   PT: Bed Mobility  --  Supine to/from sit;Supervision/stand-by assist   PT: Transfers  --  Supervision/stand-by assist;Bed to/from chair;Sit to/from stand   PT: Gait  --  Modified independent;Rolling walker;25 feet   Interventions   Interventions Quick Adds  --  Therapeutic Activity   Therapeutic Activity   Therapeutic Activities: dynamic activities to improve functional performance Minutes (59271)  --  10   Symptoms Noted During/After Treatment  --  None   Treatment Detail/Skilled Intervention  --  Transfer sit to stand from chair with FWW and assist for set up and equipment. Amb ~15 ft in room with FWW and CGA. Return to seated in chair, all needs in room and daughter in room at end of session.   PT Discharge Planning   PT Plan  --  functional strengthening, gait   PT Discharge Recommendation (DC Rec)  --  home with home care physical therapy;Leaving home requires significant taxing effort   PT Rationale for DC Rec  --  Pt has historyof falls and obed benefit from cont PT to improve strength and balance and reduce risk of falling.   Total Session Time   Timed Code Treatment Minutes  --  10   Total Session Time (sum of timed and untimed services)  --  15

## 2024-02-19 ENCOUNTER — APPOINTMENT (OUTPATIENT)
Dept: PHYSICAL THERAPY | Facility: CLINIC | Age: 79
DRG: 200 | End: 2024-02-19
Payer: MEDICARE

## 2024-02-19 ENCOUNTER — APPOINTMENT (OUTPATIENT)
Dept: GENERAL RADIOLOGY | Facility: CLINIC | Age: 79
DRG: 200 | End: 2024-02-19
Attending: FAMILY MEDICINE
Payer: MEDICARE

## 2024-02-19 VITALS
RESPIRATION RATE: 20 BRPM | TEMPERATURE: 98.3 F | WEIGHT: 129.19 LBS | SYSTOLIC BLOOD PRESSURE: 111 MMHG | HEART RATE: 72 BPM | BODY MASS INDEX: 24.39 KG/M2 | HEIGHT: 61 IN | OXYGEN SATURATION: 96 % | DIASTOLIC BLOOD PRESSURE: 68 MMHG

## 2024-02-19 LAB
ANION GAP SERPL CALCULATED.3IONS-SCNC: 10 MMOL/L (ref 7–15)
BUN SERPL-MCNC: 18.1 MG/DL (ref 8–23)
CALCIUM SERPL-MCNC: 8.9 MG/DL (ref 8.8–10.2)
CHLORIDE SERPL-SCNC: 104 MMOL/L (ref 98–107)
CREAT SERPL-MCNC: 0.64 MG/DL (ref 0.51–0.95)
DEPRECATED HCO3 PLAS-SCNC: 27 MMOL/L (ref 22–29)
EGFRCR SERPLBLD CKD-EPI 2021: 88 ML/MIN/1.73M2
ERYTHROCYTE [DISTWIDTH] IN BLOOD BY AUTOMATED COUNT: 13.1 % (ref 10–15)
GLUCOSE BLDC GLUCOMTR-MCNC: 137 MG/DL (ref 70–99)
GLUCOSE BLDC GLUCOMTR-MCNC: 165 MG/DL (ref 70–99)
GLUCOSE BLDC GLUCOMTR-MCNC: 180 MG/DL (ref 70–99)
GLUCOSE SERPL-MCNC: 169 MG/DL (ref 70–99)
HCT VFR BLD AUTO: 36.7 % (ref 35–47)
HGB BLD-MCNC: 12 G/DL (ref 11.7–15.7)
MAGNESIUM SERPL-MCNC: 1.9 MG/DL (ref 1.7–2.3)
MCH RBC QN AUTO: 30.9 PG (ref 26.5–33)
MCHC RBC AUTO-ENTMCNC: 32.7 G/DL (ref 31.5–36.5)
MCV RBC AUTO: 95 FL (ref 78–100)
PHOSPHATE SERPL-MCNC: 3.6 MG/DL (ref 2.5–4.5)
PLATELET # BLD AUTO: 156 10E3/UL (ref 150–450)
POTASSIUM SERPL-SCNC: 4.2 MMOL/L (ref 3.4–5.3)
RBC # BLD AUTO: 3.88 10E6/UL (ref 3.8–5.2)
SODIUM SERPL-SCNC: 141 MMOL/L (ref 135–145)
WBC # BLD AUTO: 6.3 10E3/UL (ref 4–11)

## 2024-02-19 PROCEDURE — 97116 GAIT TRAINING THERAPY: CPT | Mod: GP

## 2024-02-19 PROCEDURE — 80048 BASIC METABOLIC PNL TOTAL CA: CPT | Performed by: FAMILY MEDICINE

## 2024-02-19 PROCEDURE — 94150 VITAL CAPACITY TEST: CPT

## 2024-02-19 PROCEDURE — 36415 COLL VENOUS BLD VENIPUNCTURE: CPT | Performed by: FAMILY MEDICINE

## 2024-02-19 PROCEDURE — 99239 HOSP IP/OBS DSCHRG MGMT >30: CPT | Performed by: FAMILY MEDICINE

## 2024-02-19 PROCEDURE — 97530 THERAPEUTIC ACTIVITIES: CPT | Mod: GP

## 2024-02-19 PROCEDURE — 85027 COMPLETE CBC AUTOMATED: CPT | Performed by: FAMILY MEDICINE

## 2024-02-19 PROCEDURE — 83735 ASSAY OF MAGNESIUM: CPT | Performed by: FAMILY MEDICINE

## 2024-02-19 PROCEDURE — 250N000013 HC RX MED GY IP 250 OP 250 PS 637: Performed by: FAMILY MEDICINE

## 2024-02-19 PROCEDURE — 84100 ASSAY OF PHOSPHORUS: CPT | Performed by: FAMILY MEDICINE

## 2024-02-19 PROCEDURE — 71045 X-RAY EXAM CHEST 1 VIEW: CPT

## 2024-02-19 RX ORDER — METHOCARBAMOL 500 MG/1
500 TABLET, FILM COATED ORAL 3 TIMES DAILY PRN
Qty: 30 TABLET | Refills: 0 | Status: SHIPPED | OUTPATIENT
Start: 2024-02-19

## 2024-02-19 RX ORDER — METHOCARBAMOL 500 MG/1
500 TABLET, FILM COATED ORAL 3 TIMES DAILY PRN
Qty: 30 TABLET | Refills: 0 | Status: SHIPPED | OUTPATIENT
Start: 2024-02-19 | End: 2024-02-19

## 2024-02-19 RX ADMIN — LIDOCAINE 1 PATCH: 4 PATCH TOPICAL at 07:50

## 2024-02-19 RX ADMIN — INSULIN ASPART 3 UNITS: 100 INJECTION, SOLUTION INTRAVENOUS; SUBCUTANEOUS at 08:34

## 2024-02-19 RX ADMIN — ACETAMINOPHEN 650 MG: 325 TABLET, FILM COATED ORAL at 01:28

## 2024-02-19 RX ADMIN — PANTOPRAZOLE SODIUM 40 MG: 40 TABLET, DELAYED RELEASE ORAL at 04:52

## 2024-02-19 RX ADMIN — ATORVASTATIN CALCIUM 20 MG: 20 TABLET, FILM COATED ORAL at 07:51

## 2024-02-19 RX ADMIN — BUPROPION HYDROCHLORIDE 150 MG: 150 TABLET, EXTENDED RELEASE ORAL at 07:51

## 2024-02-19 RX ADMIN — GABAPENTIN 100 MG: 100 CAPSULE ORAL at 07:51

## 2024-02-19 RX ADMIN — METHOCARBAMOL 500 MG: 500 TABLET ORAL at 01:28

## 2024-02-19 RX ADMIN — MEMANTINE 10 MG: 10 TABLET ORAL at 07:51

## 2024-02-19 RX ADMIN — INSULIN HUMAN 4 UNITS: 100 INJECTION, SUSPENSION SUBCUTANEOUS at 08:33

## 2024-02-19 RX ADMIN — ACETAMINOPHEN 975 MG: 325 TABLET, FILM COATED ORAL at 04:51

## 2024-02-19 RX ADMIN — LEVOTHYROXINE SODIUM 100 MCG: 100 TABLET ORAL at 04:52

## 2024-02-19 ASSESSMENT — ACTIVITIES OF DAILY LIVING (ADL)
ADLS_ACUITY_SCORE: 33

## 2024-02-19 NOTE — DISCHARGE SUMMARY
[facility] Discharge Summary    Marcia Lawrence MRN# [id number]   Age: 79 year old YOB: 1945     Date of Admission:  2/17/2024  Date of Discharge::  2/19/2024  Admitting Physician:  Damon Crain DO  Discharge Physician:  Partha Godwin MD             Admission Diagnoses:   Fall, initial encounter [W19.XXXA]  Closed fracture of multiple ribs of left side, initial encounter [S22.42XA]          Principle Discharge Diagnosis:         Fall  Closed fracture of multiple ribs of left side, initial encounter with displacement and left paraspinal hematoma   Initial concern for possible splenic laceration on CT chest, but looks more like reactive free fluid per CT abdomen  Likely minimal left hemothorax   Initial concern for splenic laceration/bleed on chest CT, less likely on CT abdomen    See hospital course for further active diagnoses addressed during this admission.                 Medications Prior to Admission:     Medications Prior to Admission   Medication Sig Dispense Refill Last Dose    acetaminophen (TYLENOL) 325 MG tablet Take 2 tablets (650 mg) by mouth every 4 hours as needed for mild pain or fever 100 tablet  2/17/2024 at 0330    aspirin 81 MG EC tablet Take 81 mg by mouth every evening   2/16/2024 at 2100    atorvastatin (LIPITOR) 40 MG tablet Take 20 mg by mouth daily 30 tablet  2/16/2024 at am    blood glucose (ACCU-CHEK GUIDE) test strip 1 strip by In Vitro route 4 times daily   2/16/2024 at pm    buPROPion (WELLBUTRIN SR) 150 MG 12 hr tablet Take 150 mg by mouth 2 times daily   2/16/2024 at 2100    Calcium Citrate-Vitamin D (CALCIUM CITRATE + D3 PO) Take 1 tablet by mouth every evening   2/16/2024 at 2100    Calcium Citrate-Vitamin D (CALCIUM CITRATE + D3 PO) Take 2 tablets by mouth every morning   2/16/2024 at am    Cholecalciferol (VITAMIN D3 PO) Take 2,000 Units by mouth daily   2/16/2024 at am    Continuous Blood Gluc Sensor (DEXCOM G6 SENSOR) MISC 1 Device Change every 10 days.    on now but  at refill in transit    cyanocobalamin (VITAMIN B-12) 500 MCG tablet Take 500 mcg by mouth daily   2024 at am    insulin NPH (HUMULIN N VIAL) 100 UNIT/ML susp Inject 4 Units Subcutaneous daily before breakfast   2024 at am    insulin regular (NOVOLIN R VIAL) 100 UNIT/ML VIAL 3 units before breakfast and 3 units at supper Plus 1 units for every 50 mg above 120 : approximately 54 units daily   2024 at pm    Krill Oil 500 MG CAPS Take 1 capsule by mouth every evening   2024 at 2100    levothyroxine (SYNTHROID/LEVOTHROID) 100 MCG tablet Take 1 tablet by mouth daily   2024 at am    lisinopril (PRINIVIL,ZESTRIL) 40 MG tablet Take 1 tablet (40 mg) by mouth daily (Patient taking differently: Take 40 mg by mouth every evening) 90 tablet 3 2024 at 2100    memantine (NAMENDA) 10 MG tablet Take 10 mg by mouth 2 times daily   2024 at 2100    Multiple Vitamins-Minerals (EQ COMPLETE MULTIVIT ADULT 50+) TABS Take 1 tablet by mouth daily   2024 at am    omeprazole (PRILOSEC) 20 MG capsule Take 20 mg by mouth 2 times daily 60 capsule  2024 at 2100    order for DME Equipment being ordered: home blood pressure machine/monitor. 1 Device 0 on hand at not using             Discharge Medications:     Current Discharge Medication List        START taking these medications    Details   methocarbamol (ROBAXIN) 500 MG tablet Take 1 tablet (500 mg) by mouth 3 times daily as needed for muscle spasms  Qty: 30 tablet, Refills: 0    Associated Diagnoses: Closed fracture of multiple ribs of left side, initial encounter; Fall on same level from slipping, tripping and stumbling with subsequent striking against other object, initial encounter; Left-sided weakness; Cerebrovascular accident (CVA), unspecified mechanism (H)           CONTINUE these medications which have NOT CHANGED    Details   acetaminophen (TYLENOL) 325 MG tablet Take 2 tablets (650 mg) by mouth every 4 hours as needed for  mild pain or fever  Qty: 100 tablet    Associated Diagnoses: Lumbar back pain      aspirin 81 MG EC tablet Take 81 mg by mouth every evening      atorvastatin (LIPITOR) 40 MG tablet Take 20 mg by mouth daily  Qty: 30 tablet      blood glucose (ACCU-CHEK GUIDE) test strip 1 strip by In Vitro route 4 times daily      buPROPion (WELLBUTRIN SR) 150 MG 12 hr tablet Take 150 mg by mouth 2 times daily      !! Calcium Citrate-Vitamin D (CALCIUM CITRATE + D3 PO) Take 1 tablet by mouth every evening      !! Calcium Citrate-Vitamin D (CALCIUM CITRATE + D3 PO) Take 2 tablets by mouth every morning      Cholecalciferol (VITAMIN D3 PO) Take 2,000 Units by mouth daily      Continuous Blood Gluc Sensor (DEXCOM G6 SENSOR) MISC 1 Device Change every 10 days.      cyanocobalamin (VITAMIN B-12) 500 MCG tablet Take 500 mcg by mouth daily      insulin NPH (HUMULIN N VIAL) 100 UNIT/ML susp Inject 4 Units Subcutaneous daily before breakfast      insulin regular (NOVOLIN R VIAL) 100 UNIT/ML VIAL 3 units before breakfast and 3 units at supper Plus 1 units for every 50 mg above 120 : approximately 54 units daily      Krill Oil 500 MG CAPS Take 1 capsule by mouth every evening      levothyroxine (SYNTHROID/LEVOTHROID) 100 MCG tablet Take 1 tablet by mouth daily      lisinopril (PRINIVIL,ZESTRIL) 40 MG tablet Take 1 tablet (40 mg) by mouth daily  Qty: 90 tablet, Refills: 3    Associated Diagnoses: Uncontrolled hypertension      memantine (NAMENDA) 10 MG tablet Take 10 mg by mouth 2 times daily      Multiple Vitamins-Minerals (EQ COMPLETE MULTIVIT ADULT 50+) TABS Take 1 tablet by mouth daily      omeprazole (PRILOSEC) 20 MG capsule Take 20 mg by mouth 2 times daily  Qty: 60 capsule      order for DME Equipment being ordered: home blood pressure machine/monitor.  Qty: 1 Device, Refills: 0    Associated Diagnoses: Essential hypertension with goal blood pressure less than 140/90       !! - Potential duplicate medications found. Please discuss with  "provider.                   Brief History of Illness from time of admission:     From Admission H+P:   This patient is a 79 year old  female with a significant past medical history as above who presents with a mechanical fall and back/flank pain.   Patient is not the greatest historian given her known dementia, and does not provide great details to her fall, but says she has been at baseline recently.  Had lunch with her daughters yesterday and was doing well.  Then last night she got up and started walking without her walker (which she usually uses but thought she'd be ok without that time) and tripped and fell onto her left side striking a small door or some sort.  She denies having hit her head and says she did not lose consciousness and was not light-headed, dizzy or having any other symptoms at the time.  She had immediate pain in her right side/back.  No other pain or apparent injury.    Mental status at baseline still.  Feels at baseline besides pain, which is \"not too bad\" at a 3-4/10 and unchanged since the fall.    Not on any blood thinners besides aspirin.              TODAY:     Subjective:  Has done well here.   Little to no pain at rest, with activity has some pain but \"not bad\".  Wanted to work with physical therapy before discharge today and did well with them.  No other concerns, patient feels ready for discharge home today.  No other pain besides in back/left side      ROS:   ROS: 10 point ROS neg other than the symptoms noted above in the HPI.     BP (!) 160/77 (BP Location: Left arm)   Pulse 61   Temp 98.5  F (36.9  C) (Oral)   Resp 20   Ht 1.549 m (5' 1\")   Wt 58.6 kg (129 lb 3 oz)   SpO2 96%   BMI 24.41 kg/m     EXAM:  General: awake and alert, NAD, oriented x 3  Head: normocephalic  Neck: unremarkable, no lymphadenopathy   HEENT: oropharynx pink and moist    Heart: Regular rate and rhythm, no murmurs, rubs, or gallops  Lungs: clear to auscultation bilaterally with good air " movement throughout  Abdomen: soft, non-tender, no masses or organomegaly  Back: no skin break down in area of bruising   Extremities: no edema in lower extremities   Skin unremarkable as visualized.       Hospital Course:        Fall, initial encounter  Sounds like a mechanical fall due to tripping/losing her balance when ambulating without her walker which she normally uses.  No apparent injury other than to left side/back.    - ER/surgery managing trauma evaluation/survey.    - no other pain or injuries, sounds like just a mechanical fall.   - physical therapy/occupational therapy consulted.  Patient says she plans to always use her walker in the future.        Closed fracture of multiple ribs of left side, initial encounter with displacement and left paraspinal hematoma   Initial concern for possible splenic laceration on CT chest, but looks more like reactive free fluid per CT abdomen  Likely minimal left hemothorax   Initial concern for splenic laceration/bleed on chest CT, less likely on CT abdomen  - CT on admission showed markedly displaced posterior left rib fractures involving the left posterior 9th-12th ribs with 2 separate fractures in the posterior left 12th rib with left paraspinal hematoma.    - patient having just mild pain (3-4/10) on admission in back/left flank, no other pain or apparent injuries.   - respiratory status stable on admission  - hemoglobin stable, not on any blood thinners besides aspirin (held on admission as below)  - surgery consulted for trauma management  - rib fracture order set, admitted to ICU per protocol   - hemoglobin overall stable  -  Chest x-ray remained stable.    - tylenol and dilaudid (0.2 mg IV q3h) prn ordered for pain but never needed dilaudid, just robaxin which she's tolerated well and tylenol - will discharge with these   - ok for discharge home 2/19 with physical therapy/OT home care    - follow-up with primary care provider in 1 week      Contrast x  2  Patient initially only had CT chest.  With possible splenic injury I discussed this with surgery who agreed that CT abdomen was also needed, and needed with contrast.    - gave 1L LR bolus in ER to help protect kidneys, then 75cc/hour on admission then saline locked. Renal function remained stable.       Type 2 diabetes mellitus with complication (H)  - continued home NPH 4u daily.    - continued home novolog 3u with breakfast and supper starting pm 2/17   - started on very low sliding scale insulin   - high consistent carb diet    - A1c 7.1  - continue prior to admission regimen on discharge.        History of Cerebrovascular accident (CVA), unspecified mechanism (H)   Held home aspirin on admission - did well, resumed on discharge        Essential hypertension with goal blood pressure less than 140/90  Blood pressure normal to elevated on admission   - continued home lisinopril        Acquired hypothyroidism  Continued home levothyroxine      Dementia  At baseline.  Very pleasant but not always a great historian regarding details.    Continued home namenda.    - daughter sets up medications at home.         Esophageal reflux  Continued home omeprazole or equivalent PPI       Adjustment disorder with mixed anxiety and depressed mood  Continued home wellbutrin        S/p Bariatric surgery (GASTRIC BYPASS)  Continue home supplements on discharge        Hyperlipidemia LDL goal <100  Continued home Lipitor      Prophylaxis  Mechanical DVT prophylaxis given bleeding risks from trauma as above      Lines  PIV     Diet  High consistent carb            Clinically Significant Risk Factors Present on Admission                   # Hypertension: Noted on problem list                        Code Status  DNR/DNI - discussed in detail with patient on admission.  Has been full code before but was 10 years ago.    She is very confident about DNR, says DNI but seemed a bit less certain.  In the unlikely event that she is nearing  intubation would try to readdress this to confirm still DNI.   - wants all other cares up to resuscitation/intubation.              Discharge Instructions and Follow-Up:      Discharge diet: Orders Placed This Encounter      High Consistent Carb (75 g CHO per Meal) Diet       Discharge activity: Activity as tolerated   Discharge follow-up: Follow-up with primary care provider in 1 week            Discharge Disposition:     Discharged to home       Attestation:  Amount of time performed on this discharge summary: 50 minutes.    Partha Godwin MD

## 2024-02-19 NOTE — PROGRESS NOTES
02/18/24 1348   Negative Inspiratory Force (NIF)   Negative Inspiratory Force (cm H2O) -25   Effort (NIF) fair   Patient Position (NIF) sitting in chair   Vital Capacity (VC)   Vital Capacity (mL) 800   Patient Position (VC) sitting in chair   Effort (VC) fair

## 2024-02-19 NOTE — PROGRESS NOTES
[facility] [facility] DISCHARGE NOTE    Patient discharged to home at 1:40 PM via wheel chair. Accompanied by daughter and staff. Discharge instructions reviewed with patient, opportunity offered to ask questions. Prescriptions sent to patients preferred pharmacy. All belongings sent with patient.    Jessica Liu RN

## 2024-02-19 NOTE — PLAN OF CARE
Physical Therapy Discharge Summary    Reason for therapy discharge:    Discharged to home with home therapy.    Progress towards therapy goal(s). See goals on Care Plan in [facility] electronic health record for goal details.  Goals met    Therapy recommendation(s):    Continued therapy is recommended.  Rationale/Recommendations:  Recommend continued home care PT to increase indep and ease in own environment. Pt able to tolerate ambulating in hallway today, good stability and increased distance from yesterday. No further concerns re: return home to apartment today, has met IP PT goals.

## 2024-02-19 NOTE — PROGRESS NOTES
Care Management Discharge Note    Discharge Date: 02/19/2024       Discharge Disposition: Home Care    Discharge Services: None    Discharge DME: None    Discharge Transportation: family or friend will provide    Private pay costs discussed: Not applicable    Does the patient's insurance plan have a 3 day qualifying hospital stay waiver?  No    PAS Confirmation Code:  N/A  Patient/family educated on Medicare website which has current facility and service quality ratings: yes    Education Provided on the Discharge Plan: Yes  Persons Notified of Discharge Plans: Patient   Patient/Family in Agreement with the Plan: yes    Handoff Referral Completed: Yes    Additional Information:    Plan:  Home, [facility] with [facility], PT and OT.      Yasmin Pan RN

## 2024-02-19 NOTE — PROGRESS NOTES
End Of Shift Note     Situation: S/P fall. Multi rib fx's 9-12 on left side with left side/posterior hematoma, right forehead ecchymosis.     Plan: Pain control, monitor resp status, Monitor HGB level      Subjective/Objective:     Neuro: A & O x 4, forgetful     Cardiac: SR, BP wnl, afebrile     Resp: LS diminished LLL, on roomair- sat 93-95%, RR WNL     GI/: Diabetic diet, drinking H20, voiding in bathroom     Endo: Pt has DM2, monitoring BG's and using sliding scale insulin     MSK: Asst 1 w/walker, (uses walker at baseline) Pt steady     Skin: Echymosis right upper forehead, large purple bruise left lower back/ribcage     LDAs: 1 PIV SL    Managing pain with scheduled Tylenol and Muscle relaxant.

## 2024-02-19 NOTE — PROGRESS NOTES
End Of Shift Note     Situation: S/P fall. Multi rib fx's 9-12 on left side with 9th and 10th rib displaced.  Also left side/posterior hematoma, right forehead ecchymosis.     Plan: Pain control, monitor resp status, plan for discharge     Subjective/Objective:     Neuro: A & O x 4, using call light appropriately     Cardiac: SR, BP slightly elevated but unchanged     Resp: LS diminished LLL, on roomair- sat 93-95%, RR WNL. IS using when reminded 600-800 with goal set as 720.  IS with encouragement and independent..     GI/: Diabetic diet, drinking H20 and coffee, voiding in bathroom.     Endo: Pt has DM2, monitoring BG's and using sliding scale insulin     MSK: Asst 1 w/walker, (uses walker at baseline) Pt steady. Tylenol and robaxin for pain.     Skin: Echymosis right upper forehead, large purple bruise left lower back/ribcage.     LDAs: 1 PIV infusing LR at 75/hr.

## 2024-02-20 ENCOUNTER — PATIENT OUTREACH (OUTPATIENT)
Dept: CARE COORDINATION | Facility: CLINIC | Age: 79
End: 2024-02-20
Payer: MEDICARE

## 2024-02-20 ASSESSMENT — ACTIVITIES OF DAILY LIVING (ADL): DEPENDENT_IADLS:: TRANSPORTATION

## 2024-02-20 NOTE — PROGRESS NOTES
Clinic Care Coordination Contact  [facility]/Voicemail    [facility] Discharge Summary     Marcia Lawrence MRN# [id number]   Age: 79 year old YOB: 1945      Date of Admission:                  2/17/2024  Date of Discharge::                 2/19/2024  Admitting Physician:               Damon Crain DO  Discharge Physician:              Partha Godwin MD             Admission Diagnoses:   Fall, initial encounter [W19.XXXA]  Closed fracture of multiple ribs of left side, initial encounter [S22.42XA]    Clinical Data: Care Coordinator Outreach    Outreach Documentation Number of Outreach Attempt   2/20/2024   9:16 AM 1       Left message on patient's voicemail with call back information and requested return call.    Plan: . Care Coordinator will try to reach patient again in 1-2 business days.    [facility]   Kitty Santana RN, Care Coordinator   [facility]'s   E-mail [email or web address]   [phone]

## 2024-02-21 NOTE — PROGRESS NOTES
Clinic Care Coordination Contact  [facility]/Voicemail    Clinical Data: Care Coordinator Outreach    Outreach Documentation Number of Outreach Attempt   2/20/2024   9:16 AM 1   2/21/2024   2:04 PM 1       Left message on patient's voicemail with call back information and requested return call. Let a message to make a hospital follow up with PCP   Patient has home care services and lives at the [facility] Daughter sets up her medications     Plan: . Care Coordinator will do no further outreaches at this time.    [facility]   Kitty Santana RN, Care Coordinator   [facility]'s   E-mail [email or web address]   [phone]

## 2024-02-22 NOTE — PROGRESS NOTES
CC RN spoke to patient and she reports the home care RN reviewed medications and discharge instructions   Patient no longer comes to the [facility].  Patient receives her primary care through [facility] [[email or web address]]  No further outreaches will be made     [facility]   Kitty Santana RN, Care Coordinator   [facility]'s   E-mail [email or web address]   [phone]

## 2024-11-26 ENCOUNTER — NURSE TRIAGE (OUTPATIENT)
Dept: NURSING | Facility: CLINIC | Age: 79
End: 2024-11-26
Payer: MEDICARE

## 2024-11-27 NOTE — TELEPHONE ENCOUNTER
Daughter Erika austin concerned that her mother has accidentally taken a double dose of all of her medications tonight. Daughter shared that her mother is in the early stages of dementia. No consent to communicate on file and pt is seen at [facility] - Daughter is not currently with pt who lives at an independent assisted living. Daughter provided with Poison control phone number [phone] and will be calling now for advisement and to make a plan.     Care advice given per protocol and when to call back. Pt verbalized understanding and agrees to plan of care.    Shelbie Brown RN  [place] Nurse Advisor  9:25 PM 11/26/2024        Reason for Disposition   MORE THAN A DOUBLE DOSE of a prescription or over-the-counter (OTC) drug    Additional Information   Negative: [1] Intentional drug overdose AND [2] suicidal thoughts or ideas    Protocols used: Medication Question Call-A-